# Patient Record
Sex: FEMALE | Race: WHITE | NOT HISPANIC OR LATINO | Employment: UNEMPLOYED | URBAN - METROPOLITAN AREA
[De-identification: names, ages, dates, MRNs, and addresses within clinical notes are randomized per-mention and may not be internally consistent; named-entity substitution may affect disease eponyms.]

---

## 2018-01-01 ENCOUNTER — EXTERNAL RECORD (OUTPATIENT)
Dept: OTHER | Age: 38
End: 2018-01-01

## 2018-11-06 ENCOUNTER — TELEPHONE (OUTPATIENT)
Dept: ORTHOPEDICS | Age: 38
End: 2018-11-06

## 2018-11-09 ENCOUNTER — OFFICE VISIT (OUTPATIENT)
Dept: ORTHOPEDICS | Age: 38
End: 2018-11-09

## 2018-11-09 VITALS — HEIGHT: 69 IN | BODY MASS INDEX: 32.14 KG/M2 | WEIGHT: 217 LBS

## 2018-11-09 DIAGNOSIS — M51.26 LUMBAR DISC HERNIATION: Primary | ICD-10-CM

## 2018-11-09 PROCEDURE — 99203 OFFICE O/P NEW LOW 30 MIN: CPT | Performed by: ORTHOPAEDIC SURGERY

## 2018-11-09 RX ORDER — IBUPROFEN 400 MG/1
400 TABLET ORAL 3 TIMES DAILY PRN
COMMUNITY

## 2018-11-09 RX ORDER — GABAPENTIN 300 MG/1
300 CAPSULE ORAL AT BEDTIME
COMMUNITY

## 2018-11-09 RX ORDER — GABAPENTIN 100 MG/1
100 CAPSULE ORAL
COMMUNITY

## 2018-11-09 RX ORDER — DESVENLAFAXINE 100 MG/1
100 TABLET, EXTENDED RELEASE ORAL DAILY
COMMUNITY

## 2018-11-09 RX ORDER — DOCUSATE SODIUM 100 MG/1
100 CAPSULE, LIQUID FILLED ORAL DAILY
COMMUNITY

## 2018-11-09 RX ORDER — ONDANSETRON 4 MG/1
4 TABLET, ORALLY DISINTEGRATING ORAL EVERY 8 HOURS PRN
COMMUNITY

## 2018-11-09 RX ORDER — ZOLPIDEM TARTRATE 5 MG/1
5 TABLET ORAL NIGHTLY PRN
COMMUNITY

## 2018-11-09 RX ORDER — TRAMADOL HYDROCHLORIDE 50 MG/1
50 TABLET ORAL EVERY 6 HOURS PRN
COMMUNITY

## 2018-11-09 RX ORDER — EPINEPHRINE NASAL SOLUTION 1 MG/ML
0.5 SOLUTION NASAL PRN
COMMUNITY

## 2018-11-09 RX ORDER — PROMETHAZINE HYDROCHLORIDE 12.5 MG/1
12.5 TABLET ORAL EVERY 6 HOURS PRN
COMMUNITY

## 2018-11-09 RX ORDER — DIPHENHYDRAMINE HCL 25 MG
25 CAPSULE ORAL EVERY 4 HOURS PRN
COMMUNITY

## 2018-11-09 RX ORDER — RISPERIDONE 0.5 MG/1
0.5 TABLET ORAL 2 TIMES DAILY
COMMUNITY

## 2018-11-09 RX ORDER — POLYETHYLENE GLYCOL 3350 17 G/17G
17 POWDER, FOR SOLUTION ORAL DAILY
COMMUNITY

## 2018-11-09 RX ORDER — CLONAZEPAM 0.5 MG/1
0.5 TABLET ORAL 2 TIMES DAILY PRN
COMMUNITY

## 2018-11-09 SDOH — HEALTH STABILITY: MENTAL HEALTH: HOW OFTEN DO YOU HAVE A DRINK CONTAINING ALCOHOL?: NEVER

## 2018-11-12 ENCOUNTER — PREP FOR CASE (OUTPATIENT)
Dept: ORTHOPEDICS | Age: 38
End: 2018-11-12

## 2018-11-12 ENCOUNTER — TELEPHONE (OUTPATIENT)
Dept: ORTHOPEDICS | Age: 38
End: 2018-11-12

## 2018-11-12 DIAGNOSIS — M51.26 LUMBAR DISC HERNIATION: Primary | ICD-10-CM

## 2018-11-13 DIAGNOSIS — R52 PAIN: Primary | ICD-10-CM

## 2018-11-14 ENCOUNTER — SURGERY (OUTPATIENT)
Age: 38
End: 2018-11-14

## 2018-11-14 ENCOUNTER — HOSPITAL ENCOUNTER (OUTPATIENT)
Age: 38
Discharge: HOME | End: 2018-11-14
Attending: ORTHOPAEDIC SURGERY | Admitting: ORTHOPAEDIC SURGERY

## 2018-11-14 ENCOUNTER — IMAGING SERVICES (OUTPATIENT)
Dept: GENERAL RADIOLOGY | Age: 38
End: 2018-11-14
Attending: ORTHOPAEDIC SURGERY

## 2018-11-14 VITALS
WEIGHT: 214.7 LBS | HEART RATE: 82 BPM | RESPIRATION RATE: 16 BRPM | TEMPERATURE: 97 F | DIASTOLIC BLOOD PRESSURE: 69 MMHG | HEIGHT: 69 IN | BODY MASS INDEX: 31.8 KG/M2 | SYSTOLIC BLOOD PRESSURE: 104 MMHG | OXYGEN SATURATION: 98 %

## 2018-11-14 DIAGNOSIS — Z32.00 ENCOUNTER FOR PREGNANCY TEST, RESULT UNKNOWN: Primary | ICD-10-CM

## 2018-11-14 DIAGNOSIS — R52 PAIN: ICD-10-CM

## 2018-11-14 LAB
B-HCG UR QL: NEGATIVE
SP GR UR: NORMAL

## 2018-11-14 PROCEDURE — 64483 NJX AA&/STRD TFRM EPI L/S 1: CPT | Performed by: ORTHOPAEDIC SURGERY

## 2018-11-14 PROCEDURE — 64483 NJX AA&/STRD TFRM EPI L/S 1: CPT | Performed by: CLINIC/CENTER

## 2018-11-14 PROCEDURE — 99152 MOD SED SAME PHYS/QHP 5/>YRS: CPT | Performed by: ORTHOPAEDIC SURGERY

## 2018-11-14 RX ORDER — 0.9 % SODIUM CHLORIDE 0.9 %
2 VIAL (ML) INJECTION PRN
Status: DISCONTINUED | OUTPATIENT
Start: 2018-11-14 | End: 2018-11-16 | Stop reason: HOSPADM

## 2018-11-14 RX ORDER — IOPAMIDOL 612 MG/ML
INJECTION, SOLUTION INTRATHECAL PRN
Status: DISCONTINUED | OUTPATIENT
Start: 2018-11-14 | End: 2018-11-16 | Stop reason: HOSPADM

## 2018-11-14 RX ORDER — 0.9 % SODIUM CHLORIDE 0.9 %
2 VIAL (ML) INJECTION EVERY 12 HOURS SCHEDULED
Status: DISCONTINUED | OUTPATIENT
Start: 2018-11-14 | End: 2018-11-16 | Stop reason: HOSPADM

## 2018-11-14 RX ORDER — SODIUM CHLORIDE, SODIUM LACTATE, POTASSIUM CHLORIDE, CALCIUM CHLORIDE 600; 310; 30; 20 MG/100ML; MG/100ML; MG/100ML; MG/100ML
INJECTION, SOLUTION INTRAVENOUS CONTINUOUS
Status: DISCONTINUED | OUTPATIENT
Start: 2018-11-14 | End: 2018-11-16 | Stop reason: HOSPADM

## 2018-11-14 RX ORDER — MIDAZOLAM HYDROCHLORIDE 1 MG/ML
INJECTION, SOLUTION INTRAMUSCULAR; INTRAVENOUS PRN
Status: DISCONTINUED | OUTPATIENT
Start: 2018-11-14 | End: 2018-11-16 | Stop reason: HOSPADM

## 2018-11-14 RX ORDER — LIDOCAINE HYDROCHLORIDE 10 MG/ML
INJECTION, SOLUTION EPIDURAL; INFILTRATION; INTRACAUDAL; PERINEURAL PRN
Status: DISCONTINUED | OUTPATIENT
Start: 2018-11-14 | End: 2018-11-16 | Stop reason: HOSPADM

## 2018-11-14 RX ADMIN — MIDAZOLAM HYDROCHLORIDE 2 MG: 1 INJECTION, SOLUTION INTRAMUSCULAR; INTRAVENOUS at 14:47

## 2018-11-14 RX ADMIN — MIDAZOLAM HYDROCHLORIDE 1 MG: 1 INJECTION, SOLUTION INTRAMUSCULAR; INTRAVENOUS at 14:55

## 2018-11-14 RX ADMIN — IOPAMIDOL 2 ML: 612 INJECTION, SOLUTION INTRATHECAL at 14:54

## 2018-11-14 RX ADMIN — LIDOCAINE HYDROCHLORIDE 5 ML: 10 INJECTION, SOLUTION EPIDURAL; INFILTRATION; INTRACAUDAL; PERINEURAL at 14:54

## 2018-11-14 SDOH — HEALTH STABILITY: MENTAL HEALTH: HOW OFTEN DO YOU HAVE A DRINK CONTAINING ALCOHOL?: NEVER

## 2018-11-14 ASSESSMENT — PAIN SCALES - GENERAL
PAIN_LEVEL_WITH_ACTIVITY: 8
PAIN_LEVEL_AT_REST: 8
PAIN_LEVEL_AT_REST: 6

## 2018-11-14 ASSESSMENT — LIFESTYLE VARIABLES: SMOKING_HISTORY: NO

## 2018-11-19 ENCOUNTER — TELEPHONE (OUTPATIENT)
Dept: ORTHOPEDICS | Age: 38
End: 2018-11-19

## 2018-11-23 ENCOUNTER — APPOINTMENT (OUTPATIENT)
Dept: ORTHOPEDICS | Age: 38
End: 2018-11-23

## 2019-01-16 ENCOUNTER — PREP FOR CASE (OUTPATIENT)
Dept: ORTHOPEDICS | Age: 39
End: 2019-01-16

## 2019-01-16 ENCOUNTER — TELEPHONE (OUTPATIENT)
Dept: ORTHOPEDICS | Age: 39
End: 2019-01-16

## 2019-01-16 DIAGNOSIS — M51.26 LUMBAR DISC HERNIATION: Primary | ICD-10-CM

## 2021-03-31 ENCOUNTER — VIRTUAL VISIT (OUTPATIENT)
Dept: PSYCHIATRY | Facility: CLINIC | Age: 41
End: 2021-03-31
Payer: COMMERCIAL

## 2021-03-31 DIAGNOSIS — Z53.9 DIAGNOSIS NOT YET DEFINED: Primary | ICD-10-CM

## 2021-03-31 RX ORDER — ONDANSETRON 4 MG/1
4 TABLET, ORALLY DISINTEGRATING ORAL PRN
COMMUNITY

## 2021-03-31 RX ORDER — HYDROXYZINE PAMOATE 50 MG/1
50 CAPSULE ORAL
COMMUNITY
End: 2023-06-27

## 2021-03-31 RX ORDER — METHYLPREDNISOLONE 4 MG/1
4 TABLET ORAL
COMMUNITY
End: 2023-06-27

## 2021-03-31 RX ORDER — BUSPIRONE HYDROCHLORIDE 30 MG/1
30 TABLET ORAL 2 TIMES DAILY
COMMUNITY
End: 2023-06-27

## 2021-03-31 RX ORDER — LORATADINE 10 MG/1
10 CAPSULE, LIQUID FILLED ORAL
COMMUNITY
End: 2023-06-27

## 2021-03-31 RX ORDER — TRAZODONE HYDROCHLORIDE 100 MG/1
100 TABLET ORAL
COMMUNITY
End: 2023-06-27

## 2021-03-31 RX ORDER — LEVOTHYROXINE SODIUM 100 UG/1
100 TABLET ORAL DAILY
COMMUNITY

## 2021-03-31 RX ORDER — GABAPENTIN 300 MG/1
300 CAPSULE ORAL
COMMUNITY
End: 2021-09-19

## 2021-03-31 RX ORDER — PRAZOSIN HYDROCHLORIDE 5 MG/1
5 CAPSULE ORAL
COMMUNITY
End: 2023-06-27

## 2021-03-31 RX ORDER — PRAZOSIN HYDROCHLORIDE 2 MG/1
2 CAPSULE ORAL
COMMUNITY
End: 2023-06-27

## 2021-03-31 RX ORDER — CLONAZEPAM 0.5 MG/1
1 TABLET ORAL
COMMUNITY
End: 2023-06-27

## 2021-03-31 SDOH — HEALTH STABILITY: MENTAL HEALTH: HOW OFTEN DO YOU HAVE A DRINK CONTAINING ALCOHOL?: NEVER

## 2021-03-31 ASSESSMENT — PATIENT HEALTH QUESTIONNAIRE - PHQ9
5. POOR APPETITE OR OVEREATING: MORE THAN HALF THE DAYS
SUM OF ALL RESPONSES TO PHQ QUESTIONS 1-9: 3

## 2021-03-31 ASSESSMENT — ANXIETY QUESTIONNAIRES
3. WORRYING TOO MUCH ABOUT DIFFERENT THINGS: MORE THAN HALF THE DAYS
IF YOU CHECKED OFF ANY PROBLEMS ON THIS QUESTIONNAIRE, HOW DIFFICULT HAVE THESE PROBLEMS MADE IT FOR YOU TO DO YOUR WORK, TAKE CARE OF THINGS AT HOME, OR GET ALONG WITH OTHER PEOPLE: SOMEWHAT DIFFICULT
GAD7 TOTAL SCORE: 10
2. NOT BEING ABLE TO STOP OR CONTROL WORRYING: MORE THAN HALF THE DAYS
5. BEING SO RESTLESS THAT IT IS HARD TO SIT STILL: SEVERAL DAYS
7. FEELING AFRAID AS IF SOMETHING AWFUL MIGHT HAPPEN: MORE THAN HALF THE DAYS
6. BECOMING EASILY ANNOYED OR IRRITABLE: NOT AT ALL
1. FEELING NERVOUS, ANXIOUS, OR ON EDGE: SEVERAL DAYS

## 2021-03-31 NOTE — PROGRESS NOTES
Lashell is a 41 year old who is being evaluated via a billable video visit.      How would you like to obtain your AVS? MyChart  If the video visit is dropped, the invitation should be resent by: Send to e-mail at: [email or web address]  Will anyone else be joining your video visit? No      Video Start Time: 115pmg  Video-Visit Details  Amwell malfunction made this visit not possible to complete, patient said she will reschedule

## 2021-03-31 NOTE — PROGRESS NOTES
Depression Response    Patient completed the PHQ-9 assessment for depression and scored >9? No  Question 9 on the PHQ-9 was positive for suicidality? No  Does patient have current mental health provider? No    Is this a virtual visit? Yes   Does patient have suicidal ideation (positive question 9)? No - offer to place Mental Health Referral.  Patient declined referral/not needed    I personally notified the following: visit provider

## 2021-04-01 ASSESSMENT — ANXIETY QUESTIONNAIRES: GAD7 TOTAL SCORE: 10

## 2021-04-11 ENCOUNTER — HEALTH MAINTENANCE LETTER (OUTPATIENT)
Age: 41
End: 2021-04-11

## 2021-09-19 ENCOUNTER — APPOINTMENT (OUTPATIENT)
Dept: GENERAL RADIOLOGY | Facility: OTHER | Age: 41
End: 2021-09-19
Attending: EMERGENCY MEDICINE
Payer: COMMERCIAL

## 2021-09-19 ENCOUNTER — HOSPITAL ENCOUNTER (EMERGENCY)
Facility: OTHER | Age: 41
Discharge: HOME | End: 2021-09-19
Attending: EMERGENCY MEDICINE | Admitting: EMERGENCY MEDICINE
Payer: COMMERCIAL

## 2021-09-19 VITALS
HEART RATE: 116 BPM | BODY MASS INDEX: 37.03 KG/M2 | TEMPERATURE: 97.6 F | WEIGHT: 250 LBS | HEIGHT: 69 IN | DIASTOLIC BLOOD PRESSURE: 81 MMHG | SYSTOLIC BLOOD PRESSURE: 127 MMHG | OXYGEN SATURATION: 95 % | RESPIRATION RATE: 9 BRPM

## 2021-09-19 DIAGNOSIS — R07.89 ATYPICAL CHEST PAIN: ICD-10-CM

## 2021-09-19 PROBLEM — F41.0 PANIC ATTACKS: Status: ACTIVE | Noted: 2017-10-09

## 2021-09-19 PROBLEM — R10.11 RIGHT UPPER QUADRANT PAIN: Status: ACTIVE | Noted: 2021-01-21

## 2021-09-19 PROBLEM — F50.9 EATING DISORDER: Status: ACTIVE | Noted: 2021-09-19

## 2021-09-19 PROBLEM — F32.2 MAJOR DEPRESSIVE DISORDER, SINGLE EPISODE, SEVERE (H): Status: ACTIVE | Noted: 2017-03-13

## 2021-09-19 LAB
ALBUMIN SERPL-MCNC: 4.2 G/DL (ref 3.5–5.7)
ALP SERPL-CCNC: 80 U/L (ref 34–104)
ALT SERPL W P-5'-P-CCNC: 13 U/L (ref 7–52)
ANION GAP SERPL CALCULATED.3IONS-SCNC: 8 MMOL/L (ref 3–14)
AST SERPL W P-5'-P-CCNC: 12 U/L (ref 13–39)
BASOPHILS # BLD AUTO: 0 10E3/UL (ref 0–0.2)
BASOPHILS NFR BLD AUTO: 0 %
BILIRUB SERPL-MCNC: 0.3 MG/DL (ref 0.3–1)
BUN SERPL-MCNC: 13 MG/DL (ref 7–25)
CALCIUM SERPL-MCNC: 9.3 MG/DL (ref 8.6–10.3)
CHLORIDE BLD-SCNC: 104 MMOL/L (ref 98–107)
CO2 SERPL-SCNC: 24 MMOL/L (ref 21–31)
CREAT SERPL-MCNC: 1.02 MG/DL (ref 0.6–1.2)
EOSINOPHIL # BLD AUTO: 0.4 10E3/UL (ref 0–0.7)
EOSINOPHIL NFR BLD AUTO: 4 %
ERYTHROCYTE [DISTWIDTH] IN BLOOD BY AUTOMATED COUNT: 12.8 % (ref 10–15)
GFR SERPL CREATININE-BSD FRML MDRD: 68 ML/MIN/1.73M2
GLUCOSE BLD-MCNC: 109 MG/DL (ref 70–105)
HCT VFR BLD AUTO: 41.7 % (ref 35–47)
HGB BLD-MCNC: 14 G/DL (ref 11.7–15.7)
IMM GRANULOCYTES # BLD: 0.1 10E3/UL
IMM GRANULOCYTES NFR BLD: 1 %
LITHIUM SERPL-SCNC: 0.3 MMOL/L
LYMPHOCYTES # BLD AUTO: 2.6 10E3/UL (ref 0.8–5.3)
LYMPHOCYTES NFR BLD AUTO: 26 %
MCH RBC QN AUTO: 28.7 PG (ref 26.5–33)
MCHC RBC AUTO-ENTMCNC: 33.6 G/DL (ref 31.5–36.5)
MCV RBC AUTO: 86 FL (ref 78–100)
MONOCYTES # BLD AUTO: 0.5 10E3/UL (ref 0–1.3)
MONOCYTES NFR BLD AUTO: 5 %
NEUTROPHILS # BLD AUTO: 6.4 10E3/UL (ref 1.6–8.3)
NEUTROPHILS NFR BLD AUTO: 64 %
NRBC # BLD AUTO: 0 10E3/UL
NRBC BLD AUTO-RTO: 0 /100
PLATELET # BLD AUTO: 229 10E3/UL (ref 150–450)
POTASSIUM BLD-SCNC: 3.9 MMOL/L (ref 3.5–5.1)
PROT SERPL-MCNC: 6.8 G/DL (ref 6.4–8.9)
RBC # BLD AUTO: 4.88 10E6/UL (ref 3.8–5.2)
SODIUM SERPL-SCNC: 136 MMOL/L (ref 134–144)
TROPONIN I SERPL-MCNC: 4.3 PG/ML (ref 0–34)
TROPONIN I SERPL-MCNC: <2.4 PG/ML (ref 0–34)
WBC # BLD AUTO: 10 10E3/UL (ref 4–11)

## 2021-09-19 PROCEDURE — 80178 ASSAY OF LITHIUM: CPT | Performed by: EMERGENCY MEDICINE

## 2021-09-19 PROCEDURE — 84484 ASSAY OF TROPONIN QUANT: CPT | Performed by: EMERGENCY MEDICINE

## 2021-09-19 PROCEDURE — 93005 ELECTROCARDIOGRAM TRACING: CPT | Performed by: EMERGENCY MEDICINE

## 2021-09-19 PROCEDURE — 71045 X-RAY EXAM CHEST 1 VIEW: CPT

## 2021-09-19 PROCEDURE — 250N000013 HC RX MED GY IP 250 OP 250 PS 637: Performed by: EMERGENCY MEDICINE

## 2021-09-19 PROCEDURE — 250N000009 HC RX 250: Performed by: EMERGENCY MEDICINE

## 2021-09-19 PROCEDURE — 250N000011 HC RX IP 250 OP 636: Performed by: EMERGENCY MEDICINE

## 2021-09-19 PROCEDURE — 36415 COLL VENOUS BLD VENIPUNCTURE: CPT | Performed by: EMERGENCY MEDICINE

## 2021-09-19 PROCEDURE — 80053 COMPREHEN METABOLIC PANEL: CPT | Performed by: EMERGENCY MEDICINE

## 2021-09-19 PROCEDURE — 99285 EMERGENCY DEPT VISIT HI MDM: CPT | Mod: 25 | Performed by: EMERGENCY MEDICINE

## 2021-09-19 PROCEDURE — 93010 ELECTROCARDIOGRAM REPORT: CPT | Performed by: INTERNAL MEDICINE

## 2021-09-19 PROCEDURE — 99283 EMERGENCY DEPT VISIT LOW MDM: CPT | Performed by: EMERGENCY MEDICINE

## 2021-09-19 PROCEDURE — 85025 COMPLETE CBC W/AUTO DIFF WBC: CPT | Performed by: EMERGENCY MEDICINE

## 2021-09-19 RX ORDER — LITHIUM CARBONATE 600 MG/1
600 CAPSULE ORAL AT BEDTIME
COMMUNITY
End: 2023-06-27

## 2021-09-19 RX ORDER — LIDOCAINE HYDROCHLORIDE 20 MG/ML
15 SOLUTION OROPHARYNGEAL ONCE
Status: COMPLETED | OUTPATIENT
Start: 2021-09-19 | End: 2021-09-19

## 2021-09-19 RX ORDER — KETOROLAC TROMETHAMINE 30 MG/ML
30 INJECTION, SOLUTION INTRAMUSCULAR; INTRAVENOUS ONCE
Status: COMPLETED | OUTPATIENT
Start: 2021-09-19 | End: 2021-09-19

## 2021-09-19 RX ORDER — MAGNESIUM HYDROXIDE/ALUMINUM HYDROXICE/SIMETHICONE 120; 1200; 1200 MG/30ML; MG/30ML; MG/30ML
15 SUSPENSION ORAL ONCE
Status: COMPLETED | OUTPATIENT
Start: 2021-09-19 | End: 2021-09-19

## 2021-09-19 RX ORDER — DULOXETIN HYDROCHLORIDE 60 MG/1
90 CAPSULE, DELAYED RELEASE ORAL
COMMUNITY
End: 2023-06-27

## 2021-09-19 RX ORDER — ASPIRIN 81 MG/1
324 TABLET, CHEWABLE ORAL ONCE
Status: COMPLETED | OUTPATIENT
Start: 2021-09-19 | End: 2021-09-19

## 2021-09-19 RX ADMIN — KETOROLAC TROMETHAMINE 30 MG: 30 INJECTION, SOLUTION INTRAMUSCULAR; INTRAVENOUS at 12:18

## 2021-09-19 RX ADMIN — MAGNESIUM HYDROXIDE/ALUMINUM HYDROXICE/SIMETHICONE 15 ML: 120; 1200; 1200 SUSPENSION ORAL at 11:39

## 2021-09-19 RX ADMIN — ASPIRIN 81 MG CHEWABLE TABLET 324 MG: 81 TABLET CHEWABLE at 10:24

## 2021-09-19 RX ADMIN — LIDOCAINE HYDROCHLORIDE 15 ML: 20 SOLUTION ORAL; TOPICAL at 11:39

## 2021-09-19 ASSESSMENT — ENCOUNTER SYMPTOMS
DYSURIA: 0
CHILLS: 0
PALPITATIONS: 1
VOMITING: 0
NAUSEA: 0
LIGHT-HEADEDNESS: 0
FEVER: 0
SHORTNESS OF BREATH: 0
AGITATION: 0
CHEST TIGHTNESS: 0
ARTHRALGIAS: 0

## 2021-09-19 ASSESSMENT — MIFFLIN-ST. JEOR: SCORE: 1853.37

## 2021-09-19 NOTE — ED PROVIDER NOTES
History     Chief Complaint   Patient presents with     Chest Pain     HPI  Lashell West is a 41 year old female who is here with chest pain. Couple days ago when she was going to bed she experienced some anterior chest pain which was very bad however it resolved and she went to bed and slept well. Next day she was fine until the evening again she had another episode of pain last night before going to bed she woke up this morning and the symptoms are still there. She felt that her heart was racing. Her watch does take her pulse and it said at one point it was 160. It has not gotten below 100 since that time. Denies fevers or chills, no URI type symptoms does not feel she is coming down with anything. She is vaccinated for Covid. No sick contacts that she is aware of. No palpitations, diaphoresis, shortness of breath, nausea or vomiting. She does have some diarrhea which she has had for the last couple of days. It is not black bloody or tarry. She is drinking a lot of liquids and feels her urine volume is normal.    Allergies:  Allergies   Allergen Reactions     Codeine Anaphylaxis     Latex Rash     Paxil [Paroxetine] Visual Disturbance     psycho       Problem List:    Patient Active Problem List    Diagnosis Date Noted     Eating disorder 09/19/2021     Priority: Medium     Right upper quadrant pain 01/21/2021     Priority: Medium     Panic attacks 10/09/2017     Priority: Medium     Major depressive disorder, single episode, severe (H) 03/13/2017     Priority: Medium     Generalized anxiety disorder 09/14/2016     Priority: Medium     Recurrent urinary tract infection 09/14/2016     Priority: Medium     Rosacea 09/14/2016     Priority: Medium     Family history of malignant neoplasm of ovary 10/09/2014     Priority: Medium     Family history of malignant neoplasm of breast 02/08/2013     Priority: Medium     Asthma 10/18/2011     Priority: Medium     Seasonal allergies 10/18/2011     Priority: Medium     "    Past Medical History:    No past medical history on file.    Past Surgical History:    No past surgical history on file.    Family History:    No family history on file.    Social History:  Marital Status:   [2]  Social History     Tobacco Use     Smoking status: Never Smoker     Smokeless tobacco: Current User   Substance Use Topics     Alcohol use: Never     Drug use: Never        Medications:    busPIRone HCl (BUSPAR) 30 MG tablet  clonazePAM (KLONOPIN) 0.5 MG tablet  hydrOXYzine (VISTARIL) 50 MG capsule  levothyroxine (SYNTHROID/LEVOTHROID) 100 MCG tablet  lithium (ESKALITH) 600 MG capsule  loratadine 10 MG capsule  methylPREDNISolone (MEDROL) 4 MG tablet  ondansetron (ZOFRAN-ODT) 4 MG ODT tab  prazosin (MINIPRESS) 2 MG capsule  prazosin (MINIPRESS) 5 MG capsule  traZODone (DESYREL) 100 MG tablet  brexpiprazole (REXULTI) 1 MG tablet  DULoxetine (CYMBALTA) 60 MG capsule          Review of Systems   Constitutional: Negative for chills and fever.   HENT: Negative for congestion.    Eyes: Negative for visual disturbance.   Respiratory: Negative for chest tightness and shortness of breath.    Cardiovascular: Positive for chest pain and palpitations.   Gastrointestinal: Negative for nausea and vomiting.   Genitourinary: Negative for dysuria.   Musculoskeletal: Negative for arthralgias.   Skin: Negative for rash.   Neurological: Negative for light-headedness.   Psychiatric/Behavioral: Negative for agitation.       Physical Exam   BP: (!) 149/93  Pulse: 119  Temp: 97.6  F (36.4  C)  Resp: 20  Height: 175.3 cm (5' 9\")  Weight: 113.4 kg (250 lb)  SpO2: 98 %      Physical Exam  Vitals and nursing note reviewed.   Constitutional:       Appearance: She is well-developed.   HENT:      Head: Normocephalic and atraumatic.      Mouth/Throat:      Mouth: Mucous membranes are moist.   Eyes:      Conjunctiva/sclera: Conjunctivae normal.   Cardiovascular:      Rate and Rhythm: Regular rhythm. Tachycardia present.      " Heart sounds: Normal heart sounds.   Pulmonary:      Effort: Pulmonary effort is normal.      Breath sounds: Normal breath sounds.   Abdominal:      General: Bowel sounds are normal. There is no distension.      Tenderness: There is no abdominal tenderness.   Skin:     General: Skin is warm and dry.   Neurological:      Mental Status: She is alert and oriented to person, place, and time.   Psychiatric:         Behavior: Behavior normal.         ED Course        Procedures         EKG shows sinus tachycardia 112 bpm.  No acute ST segment or T wave changes.  No ectopy.       Results for orders placed or performed during the hospital encounter of 09/19/21 (from the past 24 hour(s))   CBC with platelets differential    Narrative    The following orders were created for panel order CBC with platelets differential.  Procedure                               Abnormality         Status                     ---------                               -----------         ------                     CBC with platelets and d...[[id number]]  Abnormal            Final result                 Please view results for these tests on the individual orders.   Troponin I   Result Value Ref Range    Troponin I 4.3 0.0 - 34.0 pg/mL   Comprehensive metabolic panel   Result Value Ref Range    Sodium 136 134 - 144 mmol/L    Potassium 3.9 3.5 - 5.1 mmol/L    Chloride 104 98 - 107 mmol/L    Carbon Dioxide (CO2) 24 21 - 31 mmol/L    Anion Gap 8 3 - 14 mmol/L    Urea Nitrogen 13 7 - 25 mg/dL    Creatinine 1.02 0.60 - 1.20 mg/dL    Calcium 9.3 8.6 - 10.3 mg/dL    Glucose 109 (H) 70 - 105 mg/dL    Alkaline Phosphatase 80 34 - 104 U/L    AST 12 (L) 13 - 39 U/L    ALT 13 7 - 52 U/L    Protein Total 6.8 6.4 - 8.9 g/dL    Albumin 4.2 3.5 - 5.7 g/dL    Bilirubin Total 0.3 0.3 - 1.0 mg/dL    GFR Estimate 68 >60 mL/min/1.73m2   CBC with platelets and differential   Result Value Ref Range    WBC Count 10.0 4.0 - 11.0 10e3/uL    RBC Count 4.88 3.80 - 5.20 10e6/uL     Hemoglobin 14.0 11.7 - 15.7 g/dL    Hematocrit 41.7 35.0 - 47.0 %    MCV 86 78 - 100 fL    MCH 28.7 26.5 - 33.0 pg    MCHC 33.6 31.5 - 36.5 g/dL    RDW 12.8 10.0 - 15.0 %    Platelet Count 229 150 - 450 10e3/uL    % Neutrophils 64 %    % Lymphocytes 26 %    % Monocytes 5 %    % Eosinophils 4 %    % Basophils 0 %    % Immature Granulocytes 1 %    NRBCs per 100 WBC 0 <1 /100    Absolute Neutrophils 6.4 1.6 - 8.3 10e3/uL    Absolute Lymphocytes 2.6 0.8 - 5.3 10e3/uL    Absolute Monocytes 0.5 0.0 - 1.3 10e3/uL    Absolute Eosinophils 0.4 0.0 - 0.7 10e3/uL    Absolute Basophils 0.0 0.0 - 0.2 10e3/uL    Absolute Immature Granulocytes 0.1 (H) <=0.0 10e3/uL    Absolute NRBCs 0.0 10e3/uL   Lithium level   Result Value Ref Range    Lithium 0.3   mmol/L   XR Chest Port 1 View    Narrative    Exam:  XR CHEST PORT 1 VIEW    HISTORY: chest pain.    COMPARISON:  None.    FINDINGS:     The cardiomediastinal contours are normal.      No focal consolidation, effusion, or pneumothorax.      No acute osseous abnormality.       Impression    IMPRESSION:      No acute cardiopulmonary process.      GENEVIEVE ABEBE MD         SYSTEM ID:  [id number]   Troponin I   Result Value Ref Range    Troponin I <2.4 0.0 - 34.0 pg/mL       Medications   aspirin (ASA) chewable tablet 324 mg (324 mg Oral Given 9/19/21 1024)   alum & mag hydroxide-simethicone (MAALOX) suspension 15 mL (15 mLs Oral Given 9/19/21 1139)     And   lidocaine (XYLOCAINE) 2 % solution 15 mL (15 mLs Mouth/Throat Given 9/19/21 1139)   ketorolac (TORADOL) injection 30 mg (30 mg Intravenous Given 9/19/21 1218)       Assessments & Plan (with Medical Decision Making)     I have reviewed the nursing notes.    I have reviewed the findings, diagnosis, plan and need for follow up with the patient.  I see no objective signs of any type of cardiac etiology to explain her symptoms.  GI cocktail was not helpful.  She did feel better after some Toradol.  Believe this will could be  musculoskeletal.  Also has history of anxiety and this could be playing a role here.  In any event serial troponins and an EKG and chest x-ray are all reassuring.  Will be discharged home at this time.  Return if worse.    New Prescriptions    No medications on file       Final diagnoses:   Atypical chest pain       9/19/2021   [facility]     Seymour Mckeon MD  09/19/21 [id number]

## 2021-09-19 NOTE — ED TRIAGE NOTES
"ED Nursing Triage Note (General)   ________________________________    Lashell JEANCARLOS West is a 41 year old Female that presents to triage private car  With history of  Chest pain for a couple of days. Pt says around 0500  Chest pain got worse and reports HR in the 160's  has not gone down. Chest pain is in the middle of her chest and radiates down her legs. Pt rates pain 7/10. Pt reports hx of intermittent tachycardia were her HR gets 110-120 but says it never gets above.    BP (!) 149/93   Pulse 118   Resp 20   Ht 1.753 m (5' 9\")   Wt 113.4 kg (250 lb)   SpO2 96%   Breastfeeding No   BMI 36.92 kg/m  t  Patient appears alert  and oriented, in no acute distress., and cooperative and pleasant behavior.  GCS Total = 15  Airway: intact  Breathing noted as Normal  Circulation Normal  Skin:  Normal  Action taken:  Triage to critical care immediately      PRE HOSPITAL PRIOR LIVING SITUATION Spouse  "

## 2021-09-20 LAB
ATRIAL RATE - MUSE: 112 BPM
DIASTOLIC BLOOD PRESSURE - MUSE: NORMAL MMHG
INTERPRETATION ECG - MUSE: NORMAL
P AXIS - MUSE: 69 DEGREES
PR INTERVAL - MUSE: 140 MS
QRS DURATION - MUSE: 84 MS
QT - MUSE: 346 MS
QTC - MUSE: 472 MS
R AXIS - MUSE: 58 DEGREES
SYSTOLIC BLOOD PRESSURE - MUSE: NORMAL MMHG
T AXIS - MUSE: 48 DEGREES
VENTRICULAR RATE- MUSE: 112 BPM

## 2022-05-06 ENCOUNTER — LAB REQUISITION (OUTPATIENT)
Dept: ADMINISTRATIVE | Age: 42
End: 2022-05-06
Payer: COMMERCIAL

## 2022-05-06 PROCEDURE — 81001 URINALYSIS AUTO W/SCOPE: CPT | Performed by: OTHER

## 2022-05-06 PROCEDURE — 81025 URINE PREGNANCY TEST: CPT | Performed by: OTHER

## 2022-05-07 ENCOUNTER — HEALTH MAINTENANCE LETTER (OUTPATIENT)
Age: 42
End: 2022-05-07

## 2022-05-07 LAB
B-HCG UR QL: NEGATIVE
BILIRUB UR QL: NEGATIVE
COLOR UR: YELLOW
GLUCOSE UR-MCNC: NEGATIVE MG/DL
KETONES UR-MCNC: NEGATIVE MG/DL
NITRITE UR QL STRIP.AUTO: NEGATIVE
PH UR: 5 [PH] (ref 5–8)
PROT UR-MCNC: NEGATIVE MG/DL
SP GR UR STRIP: 1.01 (ref 1–1.03)
UROBILINOGEN UR STRIP-ACNC: <2
VIT C UR-MCNC: NEGATIVE MG/DL
WBC CLUMPS UR QL AUTO: PRESENT /HPF

## 2022-05-09 ENCOUNTER — LAB REQUISITION (OUTPATIENT)
Dept: ADMINISTRATIVE | Age: 42
End: 2022-05-09
Payer: COMMERCIAL

## 2022-05-09 LAB
ALBUMIN SERPL-MCNC: 3.7 G/DL (ref 3.4–5)
ALBUMIN/GLOB SERPL: 1.2 {RATIO} (ref 1–2)
ALP LIVER SERPL-CCNC: 102 U/L
ALT SERPL-CCNC: 20 U/L
AMYLASE SERPL-CCNC: 39 U/L (ref 25–115)
ANION GAP SERPL CALC-SCNC: 5 MMOL/L (ref 0–18)
AST SERPL-CCNC: 12 U/L (ref 15–37)
BASOPHILS # BLD AUTO: 0.02 X10(3) UL (ref 0–0.2)
BASOPHILS NFR BLD AUTO: 0.2 %
BILIRUB SERPL-MCNC: 0.5 MG/DL (ref 0.1–2)
BUN BLD-MCNC: 10 MG/DL (ref 7–18)
BUN/CREAT SERPL: 12 (ref 10–20)
CALCIUM BLD-MCNC: 9.2 MG/DL (ref 8.5–10.1)
CHLORIDE SERPL-SCNC: 108 MMOL/L (ref 98–112)
CO2 SERPL-SCNC: 30 MMOL/L (ref 21–32)
CREAT BLD-MCNC: 0.83 MG/DL
DEPRECATED RDW RBC AUTO: 42.2 FL (ref 35.1–46.3)
EOSINOPHIL # BLD AUTO: 0.22 X10(3) UL (ref 0–0.7)
EOSINOPHIL NFR BLD AUTO: 2.4 %
ERYTHROCYTE [DISTWIDTH] IN BLOOD BY AUTOMATED COUNT: 12.9 % (ref 11–15)
FASTING STATUS PATIENT QL REPORTED: NO
GLOBULIN PLAS-MCNC: 3.1 G/DL (ref 2.8–4.4)
GLUCOSE BLD-MCNC: 89 MG/DL (ref 70–99)
HCT VFR BLD AUTO: 47 %
HGB BLD-MCNC: 14.7 G/DL
IMM GRANULOCYTES # BLD AUTO: 0.03 X10(3) UL (ref 0–1)
IMM GRANULOCYTES NFR BLD: 0.3 %
LYMPHOCYTES # BLD AUTO: 2.91 X10(3) UL (ref 1–4)
LYMPHOCYTES NFR BLD AUTO: 31.8 %
MAGNESIUM SERPL-MCNC: 2.3 MG/DL (ref 1.6–2.6)
MCH RBC QN AUTO: 27.9 PG (ref 26–34)
MCHC RBC AUTO-ENTMCNC: 31.3 G/DL (ref 31–37)
MCV RBC AUTO: 89.2 FL
MONOCYTES # BLD AUTO: 0.54 X10(3) UL (ref 0.1–1)
MONOCYTES NFR BLD AUTO: 5.9 %
NEUTROPHILS # BLD AUTO: 5.44 X10 (3) UL (ref 1.5–7.7)
NEUTROPHILS # BLD AUTO: 5.44 X10(3) UL (ref 1.5–7.7)
NEUTROPHILS NFR BLD AUTO: 59.4 %
OSMOLALITY SERPL CALC.SUM OF ELEC: 295 MOSM/KG (ref 275–295)
PHOSPHATE SERPL-MCNC: 3.8 MG/DL (ref 2.5–4.9)
PLATELET # BLD AUTO: 230 10(3)UL (ref 150–450)
POTASSIUM SERPL-SCNC: 4.1 MMOL/L (ref 3.5–5.1)
PROT SERPL-MCNC: 6.8 G/DL (ref 6.4–8.2)
RBC # BLD AUTO: 5.27 X10(6)UL
SODIUM SERPL-SCNC: 143 MMOL/L (ref 136–145)
TSI SER-ACNC: 12.5 MIU/ML (ref 0.36–3.74)
WBC # BLD AUTO: 9.2 X10(3) UL (ref 4–11)

## 2022-05-09 PROCEDURE — 82150 ASSAY OF AMYLASE: CPT | Performed by: OTHER

## 2022-05-09 PROCEDURE — 84443 ASSAY THYROID STIM HORMONE: CPT | Performed by: OTHER

## 2022-05-09 PROCEDURE — 36415 COLL VENOUS BLD VENIPUNCTURE: CPT | Performed by: OTHER

## 2022-05-09 PROCEDURE — 83735 ASSAY OF MAGNESIUM: CPT | Performed by: OTHER

## 2022-05-09 PROCEDURE — 80053 COMPREHEN METABOLIC PANEL: CPT | Performed by: OTHER

## 2022-05-09 PROCEDURE — 85025 COMPLETE CBC W/AUTO DIFF WBC: CPT | Performed by: OTHER

## 2022-05-09 PROCEDURE — 84100 ASSAY OF PHOSPHORUS: CPT | Performed by: OTHER

## 2022-05-10 LAB — T4 FREE SERPL-MCNC: 0.8 NG/DL (ref 0.8–1.7)

## 2022-05-10 PROCEDURE — 84439 ASSAY OF FREE THYROXINE: CPT | Performed by: OTHER

## 2023-04-22 ENCOUNTER — HEALTH MAINTENANCE LETTER (OUTPATIENT)
Age: 43
End: 2023-04-22

## 2023-05-24 ENCOUNTER — PATIENT OUTREACH (OUTPATIENT)
Dept: ONCOLOGY | Facility: CLINIC | Age: 43
End: 2023-05-24
Payer: COMMERCIAL

## 2023-05-24 ENCOUNTER — TRANSCRIBE ORDERS (OUTPATIENT)
Dept: OTHER | Age: 43
End: 2023-05-24

## 2023-05-24 DIAGNOSIS — C50.811 MALIGNANT NEOPLASM OF OVERLAPPING SITES OF RIGHT BREAST IN FEMALE, ESTROGEN RECEPTOR POSITIVE (H): Primary | ICD-10-CM

## 2023-05-24 DIAGNOSIS — Z17.0 MALIGNANT NEOPLASM OF OVERLAPPING SITES OF RIGHT BREAST IN FEMALE, ESTROGEN RECEPTOR POSITIVE (H): Primary | ICD-10-CM

## 2023-05-24 NOTE — PROGRESS NOTES
New Patient Oncology Nurse Navigator Note     Referring provider: John Degroot MD     Referring Clinic/Organization: [facility]     Referred to (specialty:) Cancer Surgery     Requested provider (if applicable): Dr. Karen Fernandez     Date Referral Received: May 24, 2023     Evaluation for:  Breast cancer     Clinical History (per Nurse review of records provided):       Sonja had bilateral screening mammograms on 11/3/22 and within the medial inferior aspect of the right breast there is nodularity and a focal asymmetry. Within the superior medial aspect of the left breast there is a focal asymmetry.  Bilateral diagnostic mammograms followed on 12/7/22 and right breast: Spot compression CC and oblique views demonstrate persistence of an irregularly marginated triangular structure measuring approximately 8 x 10 mm residing within the 3-4 o'clock region of the breast 8 cm from the nipple. Dorsal to this focus there is a less apparent but evident on CC imaging ovoid area of partially circumscribed density measuring 7 mm. Left breast: Spot compression views in the CC and oblique projections of the subareolar region demonstrate persistence of a circumscribed 7 mm mass. On ultrasounds same day: Right breast: At 3:30, 6 cm from the nipple there is an irregularly marginated 2.3 x 1.2 x 1.7 cm hypoechoic mass. At 3:30, 8 cm from the nipple there is a 9 x 7 x 7 mm irregularly marginated hypoechoic mass. This resides approximately 2 cm from the aforementioned lesion. At 3:30, 9 cm from the nipple there is a small satellite nodule measuring 4 x 3 x 5 mm.   Within the right breast at 6 o'clock, 5 cm from the nipple there is a small cyst measuring 3 x 3 mm.   Within the right axilla there is a lymph node exhibiting cortical enlargement with slight lobulation measuring 2.1 x 1.6 x 2.9 cm.   Left breast: Subareolar cysts are identified at 12 o'clock measuring 11 x 6 x 10 and 3 x 2 x 3 and at 9 o'clock, 3 x 3 x 3 mm.     12/20/22 -  Case: [id number]                                 A.  Breast, right, ultrasound guided biopsies at the 330 o'clock position 6 cm from the nipple:  - Invasive ductal carcinoma, Grade 2, ER/AR+, HER2 by FISH negative.     B.  Breast, right, ultrasound guided biopsies at the 330 o'clock position 8 cm from the nipple:  - Invasive ductal carcinoma, see synoptic report     C.  Lymph node, right axillary, ultrasound-guided biopsies:  - Positive for malignant cells, metastatic ductal carcinoma.    MRI showed multifocal disease along with right axillary adenopathy.     She completed 4 cycles of neoadjuvant chemotherapy with doxorubicin and cyclophosphamide 1/26/23-3/9/23 in the care of John Degroot [facility]. She then moved on to weekly paclitaxel 3/23/23. Final Taxol planned for 6/8/23.     Patient also scheduled with Dr. Martha Sky on 6/7 for surgery consult and Dr. Anam Keller (plastics) on 6/1.      Records Location: Care Everywhere, Media and See Bookmarked material     Records Needed:    Path reports-biopsy and surgery (as applicable)    Pathology reviews (as applicable)    Med onc notes    Surgeons' notes (plastics and breast) but partially after Rebecca consult (as applicable)    Genetic results (as applicable)    Echo or MUGA results (as applicable)    Chemotherapy summary(as applicable)    All breast imaging for past 5 years    Patient resides in [place], [place].    Patient is scheduled to meet with Dr. Karen Fernandez on 6/2 at noon.

## 2023-05-30 NOTE — TELEPHONE ENCOUNTER
RECORDS STATUS - BREAST    RECORDS REQUESTED FROM: [facility]   DATE REQUESTED:    NOTES DETAILS STATUS   OFFICE NOTE from referring provider [facility] Dr. John Dgeroot   OFFICE NOTE from medical oncologist [facility] Dr. Degroot: 23   OFFICE NOTE from surgeon [facility] Dr. Selvin Marie: 23   OPERATIVE REPORT [facility] 3/31/15: Hysteroscopy   MEDICATION LIST CE [facility]   INFUSION CE - [facility] 23   LABS     PATHOLOGY REPORTS  (Tissue diagnosis, Stage, ER/SD percentage positive and intensity of staining, HER2 IHC, FISH, and all biopsies from breast and any distant metastasis)                 [facility], Reports in CE, slides requested   [employer] 22: [id number]   GENONOMIC TESTING     TYPE:   (Next Generation Sequencing, including Foundation One testing, and Oncotype score) [facility] 23: [employer]   IMAGING (NEED IMAGES & REPORT)     CT SCANS PACS 23, 23: [facility]   MRI PACS 23: [facility]   MAMMO PACS 22, 11/3/22, 18, 17, 3/22/16: [facility]   ULTRASOUND PACS 23, 22, 22

## 2023-05-31 ENCOUNTER — ANCILLARY ORDERS (OUTPATIENT)
Dept: RADIOLOGY | Facility: CLINIC | Age: 43
End: 2023-05-31

## 2023-06-01 NOTE — PROGRESS NOTES
Action June 1, 2023 11:40 AM ABT   Action Taken Slides from [facility] received and taken to 5th floor path lab for review.    12/20/22: [id number]

## 2023-06-02 ENCOUNTER — PRE VISIT (OUTPATIENT)
Dept: ONCOLOGY | Facility: CLINIC | Age: 43
End: 2023-06-02
Payer: COMMERCIAL

## 2023-06-02 ENCOUNTER — OFFICE VISIT (OUTPATIENT)
Dept: ONCOLOGY | Facility: CLINIC | Age: 43
End: 2023-06-02
Attending: INTERNAL MEDICINE
Payer: COMMERCIAL

## 2023-06-02 ENCOUNTER — ANCILLARY ORDERS (OUTPATIENT)
Dept: ONCOLOGY | Facility: CLINIC | Age: 43
End: 2023-06-02

## 2023-06-02 ENCOUNTER — LAB REQUISITION (OUTPATIENT)
Dept: LAB | Facility: CLINIC | Age: 43
End: 2023-06-02
Payer: COMMERCIAL

## 2023-06-02 ENCOUNTER — HEALTH MAINTENANCE LETTER (OUTPATIENT)
Age: 43
End: 2023-06-02

## 2023-06-02 ENCOUNTER — ANCILLARY PROCEDURE (OUTPATIENT)
Dept: MAMMOGRAPHY | Facility: CLINIC | Age: 43
End: 2023-06-02
Attending: SURGERY
Payer: COMMERCIAL

## 2023-06-02 ENCOUNTER — PATIENT OUTREACH (OUTPATIENT)
Dept: ONCOLOGY | Facility: CLINIC | Age: 43
End: 2023-06-02

## 2023-06-02 VITALS
HEART RATE: 105 BPM | TEMPERATURE: 98.2 F | DIASTOLIC BLOOD PRESSURE: 85 MMHG | HEIGHT: 69 IN | SYSTOLIC BLOOD PRESSURE: 125 MMHG | WEIGHT: 254.5 LBS | OXYGEN SATURATION: 97 % | BODY MASS INDEX: 37.69 KG/M2

## 2023-06-02 DIAGNOSIS — R22.31 MASS OF RIGHT AXILLA: ICD-10-CM

## 2023-06-02 DIAGNOSIS — Z17.0 MALIGNANT NEOPLASM OF OVERLAPPING SITES OF RIGHT BREAST IN FEMALE, ESTROGEN RECEPTOR POSITIVE (H): Primary | ICD-10-CM

## 2023-06-02 DIAGNOSIS — R93.89 ABNORMAL FINDING ON IMAGING: ICD-10-CM

## 2023-06-02 DIAGNOSIS — C50.811 MALIGNANT NEOPLASM OF OVERLAPPING SITES OF RIGHT BREAST IN FEMALE, ESTROGEN RECEPTOR POSITIVE (H): Primary | ICD-10-CM

## 2023-06-02 DIAGNOSIS — Z85.3 PERSONAL HISTORY OF BREAST CANCER: ICD-10-CM

## 2023-06-02 PROCEDURE — 99205 OFFICE O/P NEW HI 60 MIN: CPT | Performed by: SURGERY

## 2023-06-02 PROCEDURE — G0463 HOSPITAL OUTPT CLINIC VISIT: HCPCS | Performed by: SURGERY

## 2023-06-02 PROCEDURE — 76882 US LMTD JT/FCL EVL NVASC XTR: CPT | Mod: RT | Performed by: STUDENT IN AN ORGANIZED HEALTH CARE EDUCATION/TRAINING PROGRAM

## 2023-06-02 PROCEDURE — 99417 PROLNG OP E/M EACH 15 MIN: CPT | Performed by: SURGERY

## 2023-06-02 PROCEDURE — 19285 PERQ DEV BREAST 1ST US IMAG: CPT | Mod: RT | Performed by: STUDENT IN AN ORGANIZED HEALTH CARE EDUCATION/TRAINING PROGRAM

## 2023-06-02 PROCEDURE — 88321 CONSLTJ&REPRT SLD PREP ELSWR: CPT | Performed by: PATHOLOGY

## 2023-06-02 RX ORDER — KETAMINE HCL IN 0.9 % NACL 200 MG/200
100 PLASTIC BAG, INJECTION (ML) INTRAVENOUS
COMMUNITY

## 2023-06-02 RX ORDER — CEFAZOLIN SODIUM 2 G/50ML
2 SOLUTION INTRAVENOUS
Status: CANCELLED | OUTPATIENT
Start: 2023-06-02

## 2023-06-02 RX ORDER — CETIRIZINE HYDROCHLORIDE 10 MG/1
10 CAPSULE, LIQUID FILLED ORAL DAILY
COMMUNITY

## 2023-06-02 RX ORDER — GABAPENTIN 300 MG/1
300 CAPSULE ORAL 2 TIMES DAILY
COMMUNITY
Start: 2023-05-08 | End: 2023-06-27

## 2023-06-02 RX ORDER — SODIUM PHOSPHATE, DIBASIC, ANHYDROUS, POTASSIUM PHOSPHATE, MONOBASIC, AND SODIUM PHOSPHATE, MONOBASIC, MONOHYDRATE 852; 155; 130 MG/1; MG/1; MG/1
2 TABLET, COATED ORAL 2 TIMES DAILY WITH MEALS
COMMUNITY
End: 2023-06-27

## 2023-06-02 RX ORDER — ACETAMINOPHEN 325 MG/1
975 TABLET ORAL ONCE
Status: CANCELLED | OUTPATIENT
Start: 2023-06-02 | End: 2023-06-02

## 2023-06-02 RX ORDER — ATENOLOL 25 MG/1
12.5 TABLET ORAL DAILY
COMMUNITY
End: 2023-06-27

## 2023-06-02 RX ORDER — ACETAMINOPHEN 325 MG/1
650 TABLET ORAL EVERY 4 HOURS PRN
COMMUNITY

## 2023-06-02 RX ORDER — ENOXAPARIN SODIUM 100 MG/ML
40 INJECTION SUBCUTANEOUS
Status: CANCELLED | OUTPATIENT
Start: 2023-06-02

## 2023-06-02 RX ORDER — LIDOCAINE HYDROCHLORIDE AND EPINEPHRINE 10; 10 MG/ML; UG/ML
10 INJECTION, SOLUTION INFILTRATION; PERINEURAL ONCE
Status: COMPLETED | OUTPATIENT
Start: 2023-06-02 | End: 2023-06-02

## 2023-06-02 RX ORDER — CEFAZOLIN SODIUM 2 G/50ML
2 SOLUTION INTRAVENOUS SEE ADMIN INSTRUCTIONS
Status: CANCELLED | OUTPATIENT
Start: 2023-06-02

## 2023-06-02 RX ORDER — EPINEPHRINE 0.3 MG/.3ML
0.3 INJECTION SUBCUTANEOUS PRN
COMMUNITY

## 2023-06-02 RX ORDER — ALBUTEROL SULFATE 90 UG/1
2 AEROSOL, METERED RESPIRATORY (INHALATION) 4 TIMES DAILY PRN
COMMUNITY

## 2023-06-02 RX ADMIN — LIDOCAINE HYDROCHLORIDE AND EPINEPHRINE 10 ML: 10; 10 INJECTION, SOLUTION INFILTRATION; PERINEURAL at 14:06

## 2023-06-02 ASSESSMENT — PAIN SCALES - GENERAL: PAINLEVEL: MODERATE PAIN (4)

## 2023-06-02 NOTE — LETTER
6/2/2023         RE: Lashell West  [address] [facility] [address]        Dear Colleague,    Thank you for referring your patient, Lashell West, to the [facility]. Please see a copy of my visit note below.    NEW SURGICAL CONSULTATION  Jun 2, 2023    Lashell West is a 43 year old woman who presents with a right  breast complaint.  She was referred by John Degroot MD.    HPI:    She noted no symptoms.    Imaging in December 2022 showed a 2.3 cm mass at 3:30 6 cm FN, a 9 mm mass at 3:30 8 cm FN and also a 5 mm mass at 3:30 9 cm FN, all in the RIGHT breast. In addition, a 3 mm cyst was seen at 6:00 5 cm FN in the RIGHT breast. Also, a 2.9 cm RIGHT axillary node was seen.    A biopsy was performed of the 2.3 cm mass at 3:30 6 cm FN and a clip was placed.  It showed invasive ductal carcinoma.  A biopsy was performed of the 1.6 cm mass at 3:30 8 cm FN and a clip was placed.  It showed invasive ductal carcinoma.    A biopsy was performed of the RIGHT axillary mass and a clip was placed.  It showed metastatic ductal carcinoma      Breast MRI     She is tolerating the taxol relatively well. Energy OK. Some neuropathy.  No treatment breaks.  No issues with her port since it was placed.  She did have to have two procedures for it.  The first time her port placement was attempted, she had issues and did not have enough sedation.  The port is located on the LEFT chest wall.    BREAST-SPECIFIC HISTORY:  Prior breast surgeries: No  Prior radiation history: No  Bra size: 38 I  Dominant hand: Right    FAMILY HISTORY:  Genetic testing was completed in February 2023 at [facility] - the clinic note from [facility] was reviewed. The results were negative for pathogenic variants in the [employer] x Hereditary Cancer Germline 52 gene panel.    Breast ca: Yes - sister (dx 41), mat aunts x 3, mother (dx 40), MGM (dx 50), pat aunt, PGM  Ovarian ca:  No  Pancreatic ca: No  Melanoma: No  Gastric ca: No  Colon ca: No  Other cancer: Yes son w/ thyroid ca    Patient Active Problem List   Diagnosis    Asthma    Eating disorder    Family history of malignant neoplasm of breast    Family history of malignant neoplasm of ovary    Generalized anxiety disorder    Major depressive disorder, single episode, severe (H)    Panic attacks    Recurrent urinary tract infection    Right upper quadrant pain    Rosacea    Seasonal allergies    No HTN, DM, MI, CVA  Exercise-induced asthma - no hospitalization    No past medical history on file.    No past surgical history on file.   PONV    Current Outpatient Medications   Medication Sig Dispense Refill    acetaminophen (TYLENOL) 325 MG tablet Take 650 mg by mouth every 4 hours as needed      albuterol (PROAIR HFA/PROVENTIL HFA/VENTOLIN HFA) 108 (90 Base) MCG/ACT inhaler Inhale 2 puffs into the lungs 4 times daily as needed      atenolol (TENORMIN) 25 MG tablet Take 12.5 mg by mouth daily      cetirizine HCl (ZYRTEC ALLERGY) 10 MG CAPS Take 10 mg by mouth daily      gabapentin (NEURONTIN) 300 MG capsule Take 300 mg by mouth 2 times daily      K-Phos-Neutral [id number] MG tablet Take 2 tablets by mouth 2 times daily (with meals)      Ketamine HCl 100 MG/100ML SOLN Inject 100 mg into the vein every 14 days      levothyroxine (SYNTHROID/LEVOTHROID) 100 MCG tablet Take 100 mcg by mouth daily      lithium (ESKALITH) 600 MG capsule Take 600 mg by mouth At Bedtime      loratadine 10 MG capsule Take 10 mg by mouth      ondansetron (ZOFRAN-ODT) 4 MG ODT tab Place 4 mg under the tongue      brexpiprazole (REXULTI) 1 MG tablet 3 mg (Patient not taking: Reported on 6/2/2023)      busPIRone HCl (BUSPAR) 30 MG tablet Take 30 mg by mouth 2 times daily (Patient not taking: Reported on 6/2/2023)      clonazePAM (KLONOPIN) 0.5 MG tablet Take 1 mg by mouth nightly as needed  (Patient not taking: Reported on 6/2/2023)      DULoxetine (CYMBALTA) 60 MG  "capsule Take 90 mg by mouth (Patient not taking: Reported on 6/2/2023)      EPINEPHrine (ANY BX GENERIC EQUIV) 0.3 MG/0.3ML injection 2-pack Inject 0.3 mg into the muscle as needed      hydrOXYzine (VISTARIL) 50 MG capsule Take 50 mg by mouth      methylPREDNISolone (MEDROL) 4 MG tablet Take 4 mg by mouth (Patient not taking: Reported on 6/2/2023)      prazosin (MINIPRESS) 2 MG capsule Take 2 mg by mouth (Patient not taking: Reported on 6/2/2023)      prazosin (MINIPRESS) 5 MG capsule Take 5 mg by mouth (Patient not taking: Reported on 6/2/2023)      traZODone (DESYREL) 100 MG tablet Take 100 mg by mouth (Patient not taking: Reported on 6/2/2023)             Allergies   Allergen Reactions    Latex Rash    Morphine And Related Anaphylaxis    Paxil [Paroxetine] Visual Disturbance     psycho   Steristrips and tegaderm OK  Skin glue OK    SOCIAL HISTORY:  Smokes: No - quit Feb 2023  Occupation: Does not currently work - on disability for mental health    ROS:  Easy bruising/bleeding: No  History of DVT/PE: No    /85   Pulse 105   Temp 98.2  F (36.8  C) (Oral)   Ht 1.752 m (5' 8.98\")   Wt 115.4 kg (254 lb 8 oz)   SpO2 97%   BMI 37.61 kg/m     Physical Exam  Constitutional:       Appearance: She is well-developed.   Chest:   Breasts:     Breasts are symmetrical.      Right: No inverted nipple, mass, nipple discharge, skin change or tenderness.      Left: No inverted nipple, mass, nipple discharge, skin change or tenderness.          Comments: Patient was examined in both supine and upright positions.   Lymphadenopathy:      Cervical: No cervical adenopathy.      Right cervical: No superficial, deep or posterior cervical adenopathy.     Left cervical: No superficial, deep or posterior cervical adenopathy.      Upper Body:      Right upper body: No supraclavicular, axillary or pectoral adenopathy.      Left upper body: No supraclavicular, axillary or pectoral adenopathy.      Comments: No lymphedema in bilateral " upper extremities.   Skin:     General: Skin is warm and dry.        INVESTIGATIONS:    Bilateral Breast MRI from [facility] (1/9/2023) showed:  FINDINGS: The patient's mammogram shows heterogeneously dense breast tissue. The breast MRI shows moderate background parenchymal enhancement.   There are 2 adjacent enhancing masses in the medial right breast mid and posterior depth, which correspond with the ultrasound biopsied masses. There is bridging enhancement between the masses on the breast MRI. There is also a 0.7 cm enhancing satellite lesion just 1 cm more anterior and slightly lateral to the larger anterior mass. A second small 0.6 cm enhancing satellite lesion is noted at the posterior edge of the more posterior mass, inferior edge. This was demonstrated by ultrasound. There are also several smaller more subtle foci of enhancement in the inferior subareolar region 3 cm deep to the nipple which could also represent additional disease. The main mass and 2 adjacent satellite lesions span at least 7 cm in AP diameter. Including the more anterior lesions it would span over 9 cm. No other suspicious areas of enhancement in the right breast.   There are 2 small well-circumscribed areas noted in the sagittal image which do not meet threshold enhancement and have a low suspicion appearance, likely small fibroadenomas. These are located in the central superior position and measure 6 and 9 mm in greatest diameter.   The enlarged right axillary lymph node is also demonstrated with an adjacent signal void from the biopsy marking clip. There is a small lymph node anterior and lateral to the biopsied node, which has questionable thickening of the cortex and measures 0.8 x 0.5 cm. Other lymph nodes overall show normal morphology.   No suspicious areas of enhancement in the left breast. The left axillary lymph nodes show normal morphology. Also a cyst in the subareolar left breast as demonstrated by previous ultrasound.    IMPRESSION:   1.  Two adjacent enhancing masses in the medial right breast with contiguous enhancement between the masses. Also small satellite lesions just anterior and posterior to the 2 masses. There are also several small areas of enhancement in the more anterior inferior subareolar right breast which are suspicious for additional disease. The abnormal areas of enhancement extend at least 9 cm from anterior to posterior.   2.  Two small well-circumscribed lesions noted in the central superior right breast consistent with benign lesions such as small fibroadenomas.   3.  Enlarged right axillary lymph node and equivocal thickening of the cortex of a smaller lymph node located anterior and lateral to the biopsied node. Other lymph nodes retain normal morphology.   4.  Negative left breast. Left axillary lymph nodes appear normal.   BI-RADS 6: Known biopsy-proven malignancy.    Ultrasound from [facility] (12/7/2022) showed:  FINDINGS:   Right breast: At 3:30, 6 cm from the nipple there is an irregularly marginated 2.3 x 1.2 x 1.7 cm hypoechoic mass. At 3:30, 8 cm from the nipple there is a 9 x 7 x 7 mm irregularly marginated hypoechoic mass. This resides approximately 2 cm from the aforementioned lesion. At 3:30, 9 cm from the nipple there is a small satellite nodule measuring 4 x 3 x 5 mm.   Within the right breast at 6 o'clock, 5 cm from the nipple there is a small cyst measuring 3 x 3 mm.   Within the right axilla there is a lymph node exhibiting cortical enlargement with slight lobulation measuring 2.1 x 1.6 x 2.9 cm.   Left breast: Subareolar cysts are identified at 12 o'clock measuring 11 x 6 x 10 and 3 x 2 x 3 and at 9 o'clock, 3 x 3 x 3 mm.   IMPRESSION: The findings were reviewed with the patient in detail. The right breast 3:30, 6 o'clock and 8-9 o'clock foci are suspicious for underlying malignancy. The axillary lymph node is also suspicious.   RECOMMENDATION: Ultrasound-guided biopsy of the 3:30,  6 and 8 cm from nipple foci. Additional ultrasound-guided biopsy right axillary lymph node.   BI-RADS 5: Highly suggestive of malignancy.     Diagnostic Mammogram from [facility] (12/7/2022) showed:  BREAST DENSITY: The breasts are heterogeneously dense, which may obscure small masses.   FINDINGS: Right breast: Spot compression CC and oblique views demonstrate persistence of an irregularly marginated triangular structure measuring approximately 8 x 10 mm residing within the 3-4 o'clock region of the breast 8 cm from the nipple. Dorsal to this focus there is a less apparent but evident on CC imaging ovoid area of partially circumscribed density measuring 7 mm.   Left breast: Spot compression views in the CC and oblique projections of the subareolar region demonstrate persistence of a circumscribed 7 mm mass.   IMPRESSION: Persistent asymmetries/masses both breasts.   RECOMMENDATIONS: Same-day targeted right and left breast ultrasound   BI-RADS 0: Incomplete. Needs additional imaging evaluation and/or prior mammograms for comparison.     Biopsy from [facility] (12/20/2022) showed:  Final Dx     A.  Breast, right, ultrasound guided biopsies at the 330 o'clock position 6 cm from the nipple:  - Invasive ductal carcinoma, see synoptic report     B.  Breast, right, ultrasound guided biopsies at the 330 o'clock position 8 cm from the nipple:  - Invasive ductal carcinoma, see synoptic report     C.  Lymph node, right axillary, ultrasound-guided biopsies:  - Positive for malignant cells, metastatic ductal carcinoma.     ER positive  PA positive  HER2 negative    ASSESSMENT:  Lashell West is a 43 year old woman with RIGHT breast cancer.    Her stage is:   Cancer Staging   Malignant neoplasm of overlapping sites of right breast in female, estrogen receptor positive (H)  Staging form: Breast, AJCC 8th Edition  - Clinical: Stage IIA (cT3, cN1, cM0, G2, ER+, PA+, HER2-) - Signed by Karen Fernandez MD on  6/2/2023     I personally reviewed the imaging above with our in-house breast radiologist.  I am concerned about an asymmetrically enlarged RIGHT internal mammary node seen on her initial breast MRI. We discussed that this node is not typically removed in breast cancer surgery. Given the presence of the RIGHT IM node on imaging and the biopsy proven RIGHT axillary node, adjuvant radiation will be recommended.  I reviewed this imaging with Lashell West today.    The diagnosis and management of locoregionally advanced breast cancer was discussed with Lashell West. She is scheduled to complete neoadjuvant systemic therapy on  6/8/2023. We reviewed that surgical resection is still recommended following neoadjuvant therapy, in the form of either breast conservation (segmental mastectomy plus radiation) or mastectomy.  Surgery is performed 4-6 weeks following completion of systemic therapy.  Lashell West IS a candidate for breast conservation therapy but she is interested in bilateral mastectomy given her extensive family history.  This is very reasonable.    The risks of a mastectomy were discussed with the patient, including the risks of bleeding, wound infection, wound dehiscence, skin flap/nipple necrosis, and seroma formation.   Given the need for adjuvant radiation therapy, immediate reconstruction is typically not recommended. Lashell West was interested in delayed reconstruction; a Plastic Surgery consultation was offered and will be arranged.     In addition to the surgical management of the breast, her axilla must be addressed.  She was initially found to be node positive.  Currently, the adenopathy IS NOT palpable.  I recommended a repeat RIGHT axillary US today. Should she be clinically node negative status following neoadjuvant systemic therapy, she would be a candidate for sentinel lymph node biopsy. This is performed with the combination of the radioactive Tilmanocept and dye  (lymphazurin or indocyanine green). The risks of a sentinel lymph node biopsy were discussed with the patient, including the risks of lymphedema (5-10%), bleeding, wound infection, wound dehiscence, seroma formation, and paresthesias. There is an approximately 10% false negative rate associated with sentinel lymph node biopsy as published in the literature.      She had a biopsy clip placed in the axillary node at the time of needle biopsy.  We reviewed that 85% of the time, the sentinel lymph node is the clipped node. However, because of the potential for it not to map, I have recommended an radiofrequency identification (RFID) seed-localization of that clipped node to facilitate its removal at the time of surgery. The seed would be placed prior to surgery with image guidance.    We discussed that surgical pathology results will be reviewed at the postoperative visit to allow for careful discussion of next steps and for answering questions.    I asked Lashell West to touch base with her medical oncologist regarding whether the vascular access port can be removed at the time of surgery.  She would like to have this done. The risks and benefits of a port removal were discussed, including bleeding, wound infection, and wound dehiscence.   This will be done through the mastectomy incision.    All of the above was discussed with Lashell West and all questions were answered.  She elected to proceed with BILATERAL simple mastectomy and will undergo RIGHT axillary US today.    Total time spent with the patient was 80 minutes, of which 75% was counseling.     PLAN:  BILATERAL simple mastectomy  RIGHT axillary US today to determine axillary surgery  Will need adjuvant radiation given right internal mammary node and right axillary jhonathan involvement  Plan for surgery 4-6 weeks after last cycle of chemotherapy (7/6 to 7/20)  Do not access port on the day of surgery  Patient will discuss with Dr Degroot whether her  port can be removed at the time of surgery  Plastic surgery referral for delayed reconstruction  Radiation oncology referral will be needed post-surgery    Karen Fernandez MD MS [facility] FACS  Associate Professor of Surgery  Division of Surgical Oncology  [facility]     ADDENDUM:  I did speak with Dr Hays today after Lashell West's RIGHT axillary US.  I also spoke with Lashell West regarding the findings. There is mild cortical thickening and it has significantly decreased in size. No other abnormal nodes were seen.  We therefore discussed, RFID seed-localized axillary sentinel lymph node mapping and biopsy as the jhonathan surgery option for Lashell West.  She elected to proceed with BILATERAL simple mastectomy and RIGHT RFID seed-localized axillary sentinel lymph node mapping and biopsy. She had her RFID seed placed today by Dr Hays.    Karen Fernandez MD MS [facility] FACS  Associate Professor of Surgery  Division of Surgical Oncology  [facility]     100 minutes spent on the date of the encounter doing chart review, review of outside records, review of test results, interpretation of tests, patient visit, documentation and discussion with other provider(s).

## 2023-06-02 NOTE — Clinical Note
6/2/2023         RE: Lashell West  [address] [facility] [id number]      NEW SURGICAL CONSULTATION  Jun 2, 2023    Lashell West is a 43 year old woman who presents with a right  breast complaint.  She was referred by John Degroot MD.    HPI:    She noted no symptoms.    Imaging in December 2022 showed a 2.3 cm mass at 3:30 6 cm FN, a 9 mm mass at 3:30 8 cm FN and also a 5 mm mass at 3:30 9 cm FN, all in the RIGHT breast. In addition, a 3 mm cyst was seen at 6:00 5 cm FN in the RIGHT breast. Also, a 2.9 cm RIGHT axillary node was seen.    A biopsy was performed of the 2.3 cm mass at 3:30 6 cm FN and a clip was placed.  It showed invasive ductal carcinoma.  A biopsy was performed of the 1.6 cm mass at 3:30 8 cm FN and a clip was placed.  It showed invasive ductal carcinoma.    A biopsy was performed of the RIGHT axillary mass and a clip was placed.  It showed metastatic ductal carcinoma      Breast MRI     She is tolerating the taxol relatively well. Energy OK. Some neuropathy.  No treatment breaks.  No issues with her port since it was placed.  She did have to have two procedures for it.  The first time her port placement was attempted, she had issues and did not have enough sedation.  The port is located on the LEFT chest wall.    BREAST-SPECIFIC HISTORY:  Prior breast surgeries: No  Prior radiation history: No  Bra size: 38 I  Dominant hand: Right    FAMILY HISTORY:  Genetic testing was completed in February 2023 at [facility] - the clinic note from [facility] was reviewed. The results were negative for pathogenic variants in the [employer] [facility]itary [facility] Germline 52 gene panel.    Breast ca: Yes - sister (dx 41), mat aunts x 3, mother (dx 40), MGM (dx 50), pat aunt, PGM  Ovarian ca: No  Pancreatic ca: No  Melanoma: No  Gastric ca: No  Colon ca: No  Other cancer: Yes son w/ thyroid ca    Patient Active Problem List   Diagnosis     Asthma     Eating disorder     Family  history of malignant neoplasm of breast     Family history of malignant neoplasm of ovary     Generalized anxiety disorder     Major depressive disorder, single episode, severe (H)     Panic attacks     Recurrent urinary tract infection     Right upper quadrant pain     Rosacea     Seasonal allergies    No HTN, DM, MI, CVA  Exercise-induced asthma - no hospitalization    No past medical history on file.    No past surgical history on file.   PONV    Current Outpatient Medications   Medication Sig Dispense Refill     acetaminophen (TYLENOL) 325 MG tablet Take 650 mg by mouth every 4 hours as needed       albuterol (PROAIR HFA/PROVENTIL HFA/VENTOLIN HFA) 108 (90 Base) MCG/ACT inhaler Inhale 2 puffs into the lungs 4 times daily as needed       atenolol (TENORMIN) 25 MG tablet Take 12.5 mg by mouth daily       cetirizine HCl (ZYRTEC ALLERGY) 10 MG CAPS Take 10 mg by mouth daily       gabapentin (NEURONTIN) 300 MG capsule Take 300 mg by mouth 2 times daily       K-Phos-Neutral [id number] MG tablet Take 2 tablets by mouth 2 times daily (with meals)       Ketamine HCl 100 MG/100ML SOLN Inject 100 mg into the vein every 14 days       levothyroxine (SYNTHROID/LEVOTHROID) 100 MCG tablet Take 100 mcg by mouth daily       lithium (ESKALITH) 600 MG capsule Take 600 mg by mouth At Bedtime       loratadine 10 MG capsule Take 10 mg by mouth       ondansetron (ZOFRAN-ODT) 4 MG ODT tab Place 4 mg under the tongue       brexpiprazole (REXULTI) 1 MG tablet 3 mg (Patient not taking: Reported on 6/2/2023)       busPIRone HCl (BUSPAR) 30 MG tablet Take 30 mg by mouth 2 times daily (Patient not taking: Reported on 6/2/2023)       clonazePAM (KLONOPIN) 0.5 MG tablet Take 1 mg by mouth nightly as needed  (Patient not taking: Reported on 6/2/2023)       DULoxetine (CYMBALTA) 60 MG capsule Take 90 mg by mouth (Patient not taking: Reported on 6/2/2023)       EPINEPHrine (ANY BX GENERIC EQUIV) 0.3 MG/0.3ML injection 2-pack Inject 0.3 mg into  "the muscle as needed       hydrOXYzine (VISTARIL) 50 MG capsule Take 50 mg by mouth       methylPREDNISolone (MEDROL) 4 MG tablet Take 4 mg by mouth (Patient not taking: Reported on 6/2/2023)       prazosin (MINIPRESS) 2 MG capsule Take 2 mg by mouth (Patient not taking: Reported on 6/2/2023)       prazosin (MINIPRESS) 5 MG capsule Take 5 mg by mouth (Patient not taking: Reported on 6/2/2023)       traZODone (DESYREL) 100 MG tablet Take 100 mg by mouth (Patient not taking: Reported on 6/2/2023)             Allergies   Allergen Reactions     Latex Rash     Morphine And Related Anaphylaxis     Paxil [Paroxetine] Visual Disturbance     psycho   Steristrips and tegaderm OK  Skin glue OK    SOCIAL HISTORY:  Smokes: No - quit Feb 2023  Occupation: Does not currently work - on disability for mental health    ROS:  Easy bruising/bleeding: No  History of DVT/PE: No    /85   Pulse 105   Temp 98.2  F (36.8  C) (Oral)   Ht 1.752 m (5' 8.98\")   Wt 115.4 kg (254 lb 8 oz)   SpO2 97%   BMI 37.61 kg/m     Physical Exam  Constitutional:       Appearance: She is well-developed.   Chest:   Breasts:     Breasts are symmetrical.      Right: No inverted nipple, mass, nipple discharge, skin change or tenderness.      Left: No inverted nipple, mass, nipple discharge, skin change or tenderness.          Comments: Patient was examined in both supine and upright positions.   Lymphadenopathy:      Cervical: No cervical adenopathy.      Right cervical: No superficial, deep or posterior cervical adenopathy.     Left cervical: No superficial, deep or posterior cervical adenopathy.      Upper Body:      Right upper body: No supraclavicular, axillary or pectoral adenopathy.      Left upper body: No supraclavicular, axillary or pectoral adenopathy.      Comments: No lymphedema in bilateral upper extremities.   Skin:     General: Skin is warm and dry.        INVESTIGATIONS:    Bilateral Breast MRI from [facility] (1/9/2023) " showed:  FINDINGS: The patient's mammogram shows heterogeneously dense breast tissue. The breast MRI shows moderate background parenchymal enhancement.   There are 2 adjacent enhancing masses in the medial right breast mid and posterior depth, which correspond with the ultrasound biopsied masses. There is bridging enhancement between the masses on the breast MRI. There is also a 0.7 cm enhancing satellite lesion just 1 cm more anterior and slightly lateral to the larger anterior mass. A second small 0.6 cm enhancing satellite lesion is noted at the posterior edge of the more posterior mass, inferior edge. This was demonstrated by ultrasound. There are also several smaller more subtle foci of enhancement in the inferior subareolar region 3 cm deep to the nipple which could also represent additional disease. The main mass and 2 adjacent satellite lesions span at least 7 cm in AP diameter. Including the more anterior lesions it would span over 9 cm. No other suspicious areas of enhancement in the right breast.   There are 2 small well-circumscribed areas noted in the sagittal image which do not meet threshold enhancement and have a low suspicion appearance, likely small fibroadenomas. These are located in the central superior position and measure 6 and 9 mm in greatest diameter.   The enlarged right axillary lymph node is also demonstrated with an adjacent signal void from the biopsy marking clip. There is a small lymph node anterior and lateral to the biopsied node, which has questionable thickening of the cortex and measures 0.8 x 0.5 cm. Other lymph nodes overall show normal morphology.   No suspicious areas of enhancement in the left breast. The left axillary lymph nodes show normal morphology. Also a cyst in the subareolar left breast as demonstrated by previous ultrasound.   IMPRESSION:   1.  Two adjacent enhancing masses in the medial right breast with contiguous enhancement between the masses. Also small  satellite lesions just anterior and posterior to the 2 masses. There are also several small areas of enhancement in the more anterior inferior subareolar right breast which are suspicious for additional disease. The abnormal areas of enhancement extend at least 9 cm from anterior to posterior.   2.  Two small well-circumscribed lesions noted in the central superior right breast consistent with benign lesions such as small fibroadenomas.   3.  Enlarged right axillary lymph node and equivocal thickening of the cortex of a smaller lymph node located anterior and lateral to the biopsied node. Other lymph nodes retain normal morphology.   4.  Negative left breast. Left axillary lymph nodes appear normal.   BI-RADS 6: Known biopsy-proven malignancy.    Ultrasound from [facility] (12/7/2022) showed:  FINDINGS:   Right breast: At 3:30, 6 cm from the nipple there is an irregularly marginated 2.3 x 1.2 x 1.7 cm hypoechoic mass. At 3:30, 8 cm from the nipple there is a 9 x 7 x 7 mm irregularly marginated hypoechoic mass. This resides approximately 2 cm from the aforementioned lesion. At 3:30, 9 cm from the nipple there is a small satellite nodule measuring 4 x 3 x 5 mm.   Within the right breast at 6 o'clock, 5 cm from the nipple there is a small cyst measuring 3 x 3 mm.   Within the right axilla there is a lymph node exhibiting cortical enlargement with slight lobulation measuring 2.1 x 1.6 x 2.9 cm.   Left breast: Subareolar cysts are identified at 12 o'clock measuring 11 x 6 x 10 and 3 x 2 x 3 and at 9 o'clock, 3 x 3 x 3 mm.   IMPRESSION: The findings were reviewed with the patient in detail. The right breast 3:30, 6 o'clock and 8-9 o'clock foci are suspicious for underlying malignancy. The axillary lymph node is also suspicious.   RECOMMENDATION: Ultrasound-guided biopsy of the 3:30, 6 and 8 cm from nipple foci. Additional ultrasound-guided biopsy right axillary lymph node.   BI-RADS 5: Highly suggestive of  malignancy.     Diagnostic Mammogram from  (12/7/2022) showed:  BREAST DENSITY: The breasts are heterogeneously dense, which may obscure small masses.   FINDINGS: Right breast: Spot compression CC and oblique views demonstrate persistence of an irregularly marginated triangular structure measuring approximately 8 x 10 mm residing within the 3-4 o'clock region of the breast 8 cm from the nipple. Dorsal to this focus there is a less apparent but evident on CC imaging ovoid area of partially circumscribed density measuring 7 mm.   Left breast: Spot compression views in the CC and oblique projections of the subareolar region demonstrate persistence of a circumscribed 7 mm mass.   IMPRESSION: Persistent asymmetries/masses both breasts.   RECOMMENDATIONS: Same-day targeted right and left breast ultrasound   BI-RADS 0: Incomplete. Needs additional imaging evaluation and/or prior mammograms for comparison.     Biopsy from  (12/20/2022) showed:  Final Dx     A.  Breast, right, ultrasound guided biopsies at the 330 o'clock position 6 cm from the nipple:  - Invasive ductal carcinoma, see synoptic report     B.  Breast, right, ultrasound guided biopsies at the 330 o'clock position 8 cm from the nipple:  - Invasive ductal carcinoma, see synoptic report     C.  Lymph node, right axillary, ultrasound-guided biopsies:  - Positive for malignant cells, metastatic ductal carcinoma.     ER positive  CA positive  HER2 negative    ASSESSMENT:  Lashell West is a 43 year old woman with RIGHT breast cancer.    Her stage is:   Cancer Staging   Malignant neoplasm of overlapping sites of right breast in female, estrogen receptor positive (H)  Staging form: Breast, AJCC 8th Edition  - Clinical: Stage IIA (cT3, cN1, cM0, G2, ER+, CA+, HER2-) - Signed by Karen Fernandez MD on 6/2/2023     I personally reviewed the imaging above with our in-house breast radiologist.  I am concerned about an asymmetrically  enlarged RIGHT internal mammary node seen on her initial breast MRI. We discussed that this node is not typically removed in breast cancer surgery. Given the presence of the RIGHT IM node on imaging and the biopsy proven RIGHT axillary node, adjuvant radiation will be recommended.  I reviewed this imaging with Lashell West today.    The diagnosis and management of locoregionally advanced breast cancer was discussed with Lashell West. She is scheduled to complete neoadjuvant systemic therapy on  6/8/2023. We reviewed that surgical resection is still recommended following neoadjuvant therapy, in the form of either breast conservation (segmental mastectomy plus radiation) or mastectomy.  Surgery is performed 4-6 weeks following completion of systemic therapy.  Lashell West IS a candidate for breast conservation therapy but she is interested in bilateral mastectomy given her extensive family history.  This is very reasonable.    The risks of a mastectomy were discussed with the patient, including the risks of bleeding, wound infection, wound dehiscence, skin flap/nipple necrosis, and seroma formation.   Given the need for adjuvant radiation therapy, immediate reconstruction is typically not recommended. Lashell West was interested in delayed reconstruction; a Plastic Surgery consultation was offered and will be arranged.     In addition to the surgical management of the breast, her axilla must be addressed.  She was initially found to be node positive.  Currently, the adenopathy IS NOT palpable.  I recommended a repeat RIGHT axillary US today. Should she be clinically node negative status following neoadjuvant systemic therapy, she would be a candidate for sentinel lymph node biopsy. This is performed with the combination of the radioactive Tilmanocept and dye (lymphazurin or indocyanine green). The risks of a sentinel lymph node biopsy were discussed with the patient, including the risks of  lymphedema (5-10%), bleeding, wound infection, wound dehiscence, seroma formation, and paresthesias. There is an approximately 10% false negative rate associated with sentinel lymph node biopsy as published in the literature.      She had a biopsy clip placed in the axillary node at the time of needle biopsy.  We reviewed that 85% of the time, the sentinel lymph node is the clipped node. However, because of the potential for it not to map, I have recommended an radiofrequency identification (RFID) seed-localization of that clipped node to facilitate its removal at the time of surgery. The seed would be placed prior to surgery with image guidance.    We discussed that surgical pathology results will be reviewed at the postoperative visit to allow for careful discussion of next steps and for answering questions.    I asked Lashell West to touch base with her medical oncologist regarding whether the vascular access port can be removed at the time of surgery.  She would like to have this done. The risks and benefits of a port removal were discussed, including bleeding, wound infection, and wound dehiscence.   This will be done through the mastectomy incision.    All of the above was discussed with Lashell West and all questions were answered.  She elected to proceed with BILATERAL simple mastectomy and will undergo RIGHT axillary US today.    Total time spent with the patient was 80 minutes, of which 75% was counseling.     PLAN:  1. BILATERAL simple mastectomy  2. RIGHT axillary US today to determine axillary surgery  3. Will need adjuvant radiation given right internal mammary node and right axillary jhonathan involvement  4. Plan for surgery 4-6 weeks after last cycle of chemotherapy (7/6 to 7/20)  5. Do not access port on the day of surgery  6. Patient will discuss with Dr Degroot whether her port can be removed at the time of surgery  7. Plastic surgery referral for delayed reconstruction  8. Radiation  oncology referral will be needed post-surgery    Karen Fernandez MD MS [facility] FACS  Associate Professor of Surgery  Division of Surgical Oncology  [facility]     ADDENDUM:  I did speak with Dr Hays today after Lashell West's RIGHT axillary US.  I also spoke with Lashell West regarding the findings. There is mild cortical thickening and it has significantly decreased in size. No other abnormal nodes were seen.  We therefore discussed, RFID seed-localized axillary sentinel lymph node mapping and biopsy as the jhonathan surgery option for Lashell West.  She elected to proceed with BILATERAL simple mastectomy and RIGHT RFID seed-localized axillary sentinel lymph node mapping and biopsy. She had her RFID seed placed today by Dr Hays.    Karen Fernandez MD MS [facility] FACS  Associate Professor of Surgery  Division of Surgical Oncology  [facility]     100 minutes spent on the date of the encounter doing chart review, review of outside records, review of test results, interpretation of tests, patient visit, documentation and discussion with other provider(s).            Karen Fernandez MD

## 2023-06-02 NOTE — LETTER
2023         RE: Lashell West  [address] [facility] [address]    2023    John Degroot MD  [address] [place] [id number]    RE: Lashell West  (: 1980)    Dear Dr. Degroot     Your patient was seen for evaluation in my office.  Please find a copy of my notes for your record and review.  If you have any further questions, please feel free to contact my office.   Thank you for your kind referral.    Sincerely,   Karen Fernandez MD MSc [facility]    ---     NEW SURGICAL CONSULTATION  2023    Lashell West is a 43 year old woman who presents with a right  breast complaint.  She was referred by John Degroot MD.    HPI:    She noted no symptoms.    Imaging in 2022 showed a 2.3 cm mass at 3:30 6 cm FN, a 9 mm mass at 3:30 8 cm FN and also a 5 mm mass at 3:30 9 cm FN, all in the RIGHT breast. In addition, a 3 mm cyst was seen at 6:00 5 cm FN in the RIGHT breast. Also, a 2.9 cm RIGHT axillary node was seen.    A biopsy was performed of the 2.3 cm mass at 3:30 6 cm FN and a clip was placed.  It showed invasive ductal carcinoma.  A biopsy was performed of the 1.6 cm mass at 3:30 8 cm FN and a clip was placed.  It showed invasive ductal carcinoma.    A biopsy was performed of the RIGHT axillary mass and a clip was placed.  It showed metastatic ductal carcinoma      Breast MRI     She is tolerating the taxol relatively well. Energy OK. Some neuropathy.  No treatment breaks.  No issues with her port since it was placed.  She did have to have two procedures for it.  The first time her port placement was attempted, she had issues and did not have enough sedation.  The port is located on the LEFT chest wall.    BREAST-SPECIFIC HISTORY:  Prior breast surgeries: No  Prior radiation history: No  Bra size: 38 I  Dominant hand: Right    FAMILY HISTORY:  Genetic testing was completed in 2023 at [facility] clinic note from Lucía  Jocelynn [facility] was reviewed. The results were negative for pathogenic variants in the [facility] Germline 52 gene panel.    Breast ca: Yes - sister (dx 41), mat aunts x 3, mother (dx 40), MGM (dx 50), pat aunt, PGM  Ovarian ca: No  Pancreatic ca: No  Melanoma: No  Gastric ca: No  Colon ca: No  Other cancer: Yes son w/ thyroid ca    Patient Active Problem List   Diagnosis    Asthma    Eating disorder    Family history of malignant neoplasm of breast    Family history of malignant neoplasm of ovary    Generalized anxiety disorder    Major depressive disorder, single episode, severe (H)    Panic attacks    Recurrent urinary tract infection    Right upper quadrant pain    Rosacea    Seasonal allergies    No HTN, DM, MI, CVA  Exercise-induced asthma - no hospitalization    No past medical history on file.    No past surgical history on file.   PONV    Current Outpatient Medications   Medication Sig Dispense Refill    acetaminophen (TYLENOL) 325 MG tablet Take 650 mg by mouth every 4 hours as needed      albuterol (PROAIR HFA/PROVENTIL HFA/VENTOLIN HFA) 108 (90 Base) MCG/ACT inhaler Inhale 2 puffs into the lungs 4 times daily as needed      atenolol (TENORMIN) 25 MG tablet Take 12.5 mg by mouth daily      cetirizine HCl (ZYRTEC ALLERGY) 10 MG CAPS Take 10 mg by mouth daily      gabapentin (NEURONTIN) 300 MG capsule Take 300 mg by mouth 2 times daily      K-Phos-Neutral [id number] MG tablet Take 2 tablets by mouth 2 times daily (with meals)      Ketamine HCl 100 MG/100ML SOLN Inject 100 mg into the vein every 14 days      levothyroxine (SYNTHROID/LEVOTHROID) 100 MCG tablet Take 100 mcg by mouth daily      lithium (ESKALITH) 600 MG capsule Take 600 mg by mouth At Bedtime      loratadine 10 MG capsule Take 10 mg by mouth      ondansetron (ZOFRAN-ODT) 4 MG ODT tab Place 4 mg under the tongue      brexpiprazole (REXULTI) 1 MG tablet 3 mg (Patient not taking: Reported on 6/2/2023)      busPIRone HCl (BUSPAR) 30  "MG tablet Take 30 mg by mouth 2 times daily (Patient not taking: Reported on 6/2/2023)      clonazePAM (KLONOPIN) 0.5 MG tablet Take 1 mg by mouth nightly as needed  (Patient not taking: Reported on 6/2/2023)      DULoxetine (CYMBALTA) 60 MG capsule Take 90 mg by mouth (Patient not taking: Reported on 6/2/2023)      EPINEPHrine (ANY BX GENERIC EQUIV) 0.3 MG/0.3ML injection 2-pack Inject 0.3 mg into the muscle as needed      hydrOXYzine (VISTARIL) 50 MG capsule Take 50 mg by mouth      methylPREDNISolone (MEDROL) 4 MG tablet Take 4 mg by mouth (Patient not taking: Reported on 6/2/2023)      prazosin (MINIPRESS) 2 MG capsule Take 2 mg by mouth (Patient not taking: Reported on 6/2/2023)      prazosin (MINIPRESS) 5 MG capsule Take 5 mg by mouth (Patient not taking: Reported on 6/2/2023)      traZODone (DESYREL) 100 MG tablet Take 100 mg by mouth (Patient not taking: Reported on 6/2/2023)             Allergies   Allergen Reactions    Latex Rash    Morphine And Related Anaphylaxis    Paxil [Paroxetine] Visual Disturbance     psycho   Steristrips and tegaderm OK  Skin glue OK    SOCIAL HISTORY:  Smokes: No - quit Feb 2023  Occupation: Does not currently work - on disability for mental health    ROS:  Easy bruising/bleeding: No  History of DVT/PE: No    /85   Pulse 105   Temp 98.2  F (36.8  C) (Oral)   Ht 1.752 m (5' 8.98\")   Wt 115.4 kg (254 lb 8 oz)   SpO2 97%   BMI 37.61 kg/m     Physical Exam  Constitutional:       Appearance: She is well-developed.   Chest:   Breasts:     Breasts are symmetrical.      Right: No inverted nipple, mass, nipple discharge, skin change or tenderness.      Left: No inverted nipple, mass, nipple discharge, skin change or tenderness.          Comments: Patient was examined in both supine and upright positions.   Lymphadenopathy:      Cervical: No cervical adenopathy.      Right cervical: No superficial, deep or posterior cervical adenopathy.     Left cervical: No superficial, deep " or posterior cervical adenopathy.      Upper Body:      Right upper body: No supraclavicular, axillary or pectoral adenopathy.      Left upper body: No supraclavicular, axillary or pectoral adenopathy.      Comments: No lymphedema in bilateral upper extremities.   Skin:     General: Skin is warm and dry.        INVESTIGATIONS:    Bilateral Breast MRI from [facility] (1/9/2023) showed:  FINDINGS: The patient's mammogram shows heterogeneously dense breast tissue. The breast MRI shows moderate background parenchymal enhancement.   There are 2 adjacent enhancing masses in the medial right breast mid and posterior depth, which correspond with the ultrasound biopsied masses. There is bridging enhancement between the masses on the breast MRI. There is also a 0.7 cm enhancing satellite lesion just 1 cm more anterior and slightly lateral to the larger anterior mass. A second small 0.6 cm enhancing satellite lesion is noted at the posterior edge of the more posterior mass, inferior edge. This was demonstrated by ultrasound. There are also several smaller more subtle foci of enhancement in the inferior subareolar region 3 cm deep to the nipple which could also represent additional disease. The main mass and 2 adjacent satellite lesions span at least 7 cm in AP diameter. Including the more anterior lesions it would span over 9 cm. No other suspicious areas of enhancement in the right breast.   There are 2 small well-circumscribed areas noted in the sagittal image which do not meet threshold enhancement and have a low suspicion appearance, likely small fibroadenomas. These are located in the central superior position and measure 6 and 9 mm in greatest diameter.   The enlarged right axillary lymph node is also demonstrated with an adjacent signal void from the biopsy marking clip. There is a small lymph node anterior and lateral to the biopsied node, which has questionable thickening of the cortex and measures 0.8 x 0.5 cm.  Other lymph nodes overall show normal morphology.   No suspicious areas of enhancement in the left breast. The left axillary lymph nodes show normal morphology. Also a cyst in the subareolar left breast as demonstrated by previous ultrasound.   IMPRESSION:   1.  Two adjacent enhancing masses in the medial right breast with contiguous enhancement between the masses. Also small satellite lesions just anterior and posterior to the 2 masses. There are also several small areas of enhancement in the more anterior inferior subareolar right breast which are suspicious for additional disease. The abnormal areas of enhancement extend at least 9 cm from anterior to posterior.   2.  Two small well-circumscribed lesions noted in the central superior right breast consistent with benign lesions such as small fibroadenomas.   3.  Enlarged right axillary lymph node and equivocal thickening of the cortex of a smaller lymph node located anterior and lateral to the biopsied node. Other lymph nodes retain normal morphology.   4.  Negative left breast. Left axillary lymph nodes appear normal.   BI-RADS 6: Known biopsy-proven malignancy.    Ultrasound from [facility] (12/7/2022) showed:  FINDINGS:   Right breast: At 3:30, 6 cm from the nipple there is an irregularly marginated 2.3 x 1.2 x 1.7 cm hypoechoic mass. At 3:30, 8 cm from the nipple there is a 9 x 7 x 7 mm irregularly marginated hypoechoic mass. This resides approximately 2 cm from the aforementioned lesion. At 3:30, 9 cm from the nipple there is a small satellite nodule measuring 4 x 3 x 5 mm.   Within the right breast at 6 o'clock, 5 cm from the nipple there is a small cyst measuring 3 x 3 mm.   Within the right axilla there is a lymph node exhibiting cortical enlargement with slight lobulation measuring 2.1 x 1.6 x 2.9 cm.   Left breast: Subareolar cysts are identified at 12 o'clock measuring 11 x 6 x 10 and 3 x 2 x 3 and at 9 o'clock, 3 x 3 x 3 mm.   IMPRESSION: The  findings were reviewed with the patient in detail. The right breast 3:30, 6 o'clock and 8-9 o'clock foci are suspicious for underlying malignancy. The axillary lymph node is also suspicious.   RECOMMENDATION: Ultrasound-guided biopsy of the 3:30, 6 and 8 cm from nipple foci. Additional ultrasound-guided biopsy right axillary lymph node.   BI-RADS 5: Highly suggestive of malignancy.     Diagnostic Mammogram from [facility] (12/7/2022) showed:  BREAST DENSITY: The breasts are heterogeneously dense, which may obscure small masses.   FINDINGS: Right breast: Spot compression CC and oblique views demonstrate persistence of an irregularly marginated triangular structure measuring approximately 8 x 10 mm residing within the 3-4 o'clock region of the breast 8 cm from the nipple. Dorsal to this focus there is a less apparent but evident on CC imaging ovoid area of partially circumscribed density measuring 7 mm.   Left breast: Spot compression views in the CC and oblique projections of the subareolar region demonstrate persistence of a circumscribed 7 mm mass.   IMPRESSION: Persistent asymmetries/masses both breasts.   RECOMMENDATIONS: Same-day targeted right and left breast ultrasound   BI-RADS 0: Incomplete. Needs additional imaging evaluation and/or prior mammograms for comparison.     Biopsy from [facility] (12/20/2022) showed:  Final Dx     A.  Breast, right, ultrasound guided biopsies at the 330 o'clock position 6 cm from the nipple:  - Invasive ductal carcinoma, see synoptic report     B.  Breast, right, ultrasound guided biopsies at the 330 o'clock position 8 cm from the nipple:  - Invasive ductal carcinoma, see synoptic report     C.  Lymph node, right axillary, ultrasound-guided biopsies:  - Positive for malignant cells, metastatic ductal carcinoma.     ER positive  NM positive  HER2 negative    ASSESSMENT:  Lashell West is a 43 year old woman with RIGHT breast cancer.    Her stage is:   Cancer  Staging   Malignant neoplasm of overlapping sites of right breast in female, estrogen receptor positive (H)  Staging form: Breast, AJCC 8th Edition  - Clinical: Stage IIA (cT3, cN1, cM0, G2, ER+, HI+, HER2-) - Signed by Karen Fernandez MD on 6/2/2023     I personally reviewed the imaging above with our in-house breast radiologist.  I am concerned about an asymmetrically enlarged RIGHT internal mammary node seen on her initial breast MRI. We discussed that this node is not typically removed in breast cancer surgery. Given the presence of the RIGHT IM node on imaging and the biopsy proven RIGHT axillary node, adjuvant radiation will be recommended.  I reviewed this imaging with Lashell West today.    The diagnosis and management of locoregionally advanced breast cancer was discussed with Lashell West. She is scheduled to complete neoadjuvant systemic therapy on  6/8/2023. We reviewed that surgical resection is still recommended following neoadjuvant therapy, in the form of either breast conservation (segmental mastectomy plus radiation) or mastectomy.  Surgery is performed 4-6 weeks following completion of systemic therapy.  Lashell West IS a candidate for breast conservation therapy but she is interested in bilateral mastectomy given her extensive family history.  This is very reasonable.    The risks of a mastectomy were discussed with the patient, including the risks of bleeding, wound infection, wound dehiscence, skin flap/nipple necrosis, and seroma formation.   Given the need for adjuvant radiation therapy, immediate reconstruction is typically not recommended. Lashell West was interested in delayed reconstruction; a Plastic Surgery consultation was offered and will be arranged.     In addition to the surgical management of the breast, her axilla must be addressed.  She was initially found to be node positive.  Currently, the adenopathy IS NOT palpable.  I recommended a repeat RIGHT  axillary US today. Should she be clinically node negative status following neoadjuvant systemic therapy, she would be a candidate for sentinel lymph node biopsy. This is performed with the combination of the radioactive Tilmanocept and dye (lymphazurin or indocyanine green). The risks of a sentinel lymph node biopsy were discussed with the patient, including the risks of lymphedema (5-10%), bleeding, wound infection, wound dehiscence, seroma formation, and paresthesias. There is an approximately 10% false negative rate associated with sentinel lymph node biopsy as published in the literature.      She had a biopsy clip placed in the axillary node at the time of needle biopsy.  We reviewed that 85% of the time, the sentinel lymph node is the clipped node. However, because of the potential for it not to map, I have recommended an radiofrequency identification (RFID) seed-localization of that clipped node to facilitate its removal at the time of surgery. The seed would be placed prior to surgery with image guidance.    We discussed that surgical pathology results will be reviewed at the postoperative visit to allow for careful discussion of next steps and for answering questions.    I asked Lashell West to touch base with her medical oncologist regarding whether the vascular access port can be removed at the time of surgery.  She would like to have this done. The risks and benefits of a port removal were discussed, including bleeding, wound infection, and wound dehiscence.   This will be done through the mastectomy incision.    All of the above was discussed with Lashell West and all questions were answered.  She elected to proceed with BILATERAL simple mastectomy and will undergo RIGHT axillary US today.    Total time spent with the patient was 80 minutes, of which 75% was counseling.     PLAN:  BILATERAL simple mastectomy  RIGHT axillary US today to determine axillary surgery  Will need adjuvant radiation  given right internal mammary node and right axillary jhonathan involvement  Plan for surgery 4-6 weeks after last cycle of chemotherapy (7/6 to 7/20)  Do not access port on the day of surgery  Patient will discuss with Dr Degroot whether her port can be removed at the time of surgery  Plastic surgery referral for delayed reconstruction  Radiation oncology referral will be needed post-surgery    Karen Fernandez MD MS [facility] FACS  Associate Professor of Surgery  Division of Surgical Oncology  [facility]     ADDENDUM:  I did speak with Dr Hays today after Lashell Ascenciondhenok's RIGHT axillary US.  I also spoke with Lashell West regarding the findings. There is mild cortical thickening and it has significantly decreased in size. No other abnormal nodes were seen.  We therefore discussed, RFID seed-localized axillary sentinel lymph node mapping and biopsy as the jhonathan surgery option for Lashell West.  She elected to proceed with BILATERAL simple mastectomy and RIGHT RFID seed-localized axillary sentinel lymph node mapping and biopsy. She had her RFID seed placed today by Dr Hays.    Karen Fernandez MD MS [facility] FACS  Associate Professor of Surgery  Division of Surgical Oncology  [facility]     100 minutes spent on the date of the encounter doing chart review, review of outside records, review of test results, interpretation of tests, patient visit, documentation and discussion with other provider(s).

## 2023-06-02 NOTE — NURSING NOTE
"  Oncology Rooming Note    June 2, 2023 12:18 PM   Lashell West is a 43 year old female who presents for:    Chief Complaint   Patient presents with     Oncology Clinic Visit     NEW EVAL: Breast cancer     Initial Vitals: /85   Pulse 105   Temp 98.2  F (36.8  C) (Oral)   Ht 1.752 m (5' 8.98\")   Wt 115.4 kg (254 lb 8 oz)   SpO2 97%   BMI 37.61 kg/m   Estimated body mass index is 37.61 kg/m  as calculated from the following:    Height as of this encounter: 1.752 m (5' 8.98\").    Weight as of this encounter: 115.4 kg (254 lb 8 oz). Body surface area is 2.37 meters squared.  Moderate Pain (4) Comment: Data Unavailable   No LMP recorded. Patient has had an ablation.  Allergies reviewed: Yes  Medications reviewed: Yes    Medications: Medication refills not needed today.  Pharmacy name entered into Snap[employer]: [id number] - [place], [place] - [address] [facility] [address]    Clinical concerns: None    Aixa Gabriel              "

## 2023-06-02 NOTE — PROGRESS NOTES
NEW SURGICAL CONSULTATION  Jun 2, 2023    Lashell eWst is a 43 year old woman who presents with a right  breast complaint.  She was referred by John Degroot MD.    HPI:    She noted no symptoms. She notes no masses in either breast, axilla, or neck. She denies any nipple discharge or nipple inversion.     Imaging in December 2022 showed a 2.3 cm mass at 3:30 6 cm FN, a 9 mm mass at 3:30 8 cm FN and also a 5 mm mass at 3:30 9 cm FN, all in the RIGHT breast. In addition, a 3 mm cyst was seen at 6:00 5 cm FN in the RIGHT breast. Also, a 2.9 cm RIGHT axillary node was seen.    A biopsy was performed of the 2.3 cm mass at 3:30 6 cm FN and a clip was placed.  It showed invasive ductal carcinoma.  A biopsy was performed of the 1.6 cm mass at 3:30 8 cm FN and a clip was placed.  It showed invasive ductal carcinoma.    A biopsy was performed of the RIGHT axillary mass and a clip was placed.  It showed metastatic ductal carcinoma      Breast MRI in January 2023 (per report) showed two adjacent enhancing masses in the RIGHT medial breast and satellites anterior and posterior to the two main masses. Total abnormal aread was 9 cm. Enlarged right axillary node was seen.    She met with Dr Degroot and began treatment with neoadjuvant AC (1/26/2023 to 3/9/2023) followed by paclitaxel (started 3/23/2023)    She is tolerating the taxol relatively well. Energy OK. Some neuropathy.  No treatment breaks.  No issues with her port since it was placed.  She did have to have two procedures for it.  The first time her port placement was attempted, she had issues and did not have enough sedation.  The port is located on the LEFT chest wall.    BREAST-SPECIFIC HISTORY:  Prior breast surgeries: No  Prior radiation history: No  Bra size: 38 I  Dominant hand: Right    FAMILY HISTORY:  Genetic testing was completed in February 2023 at [facility] - the clinic note from [facility] was reviewed. The results were  negative for pathogenic variants in the [employer] Germline 52 gene panel.    Breast ca: Yes - sister (dx 41), mat aunts x 3, mother (dx 40), MGM (dx 50), pat aunt, PGM  Ovarian ca: No  Pancreatic ca: No  Melanoma: No  Gastric ca: No  Colon ca: No  Other cancer: Yes son w/ thyroid ca    Patient Active Problem List   Diagnosis     Asthma     Eating disorder     Family history of malignant neoplasm of breast     Family history of malignant neoplasm of ovary     Generalized anxiety disorder     Major depressive disorder, single episode, severe (H)     Panic attacks     Recurrent urinary tract infection     Right upper quadrant pain     Rosacea     Seasonal allergies    No HTN, DM, MI, CVA  Exercise-induced asthma - no hospitalization    No past medical history on file.    No past surgical history on file.   PONV    Current Outpatient Medications   Medication Sig Dispense Refill     acetaminophen (TYLENOL) 325 MG tablet Take 650 mg by mouth every 4 hours as needed       albuterol (PROAIR HFA/PROVENTIL HFA/VENTOLIN HFA) 108 (90 Base) MCG/ACT inhaler Inhale 2 puffs into the lungs 4 times daily as needed       atenolol (TENORMIN) 25 MG tablet Take 12.5 mg by mouth daily       cetirizine HCl (ZYRTEC ALLERGY) 10 MG CAPS Take 10 mg by mouth daily       gabapentin (NEURONTIN) 300 MG capsule Take 300 mg by mouth 2 times daily       K-Phos-Neutral [id number] MG tablet Take 2 tablets by mouth 2 times daily (with meals)       Ketamine HCl 100 MG/100ML SOLN Inject 100 mg into the vein every 14 days       levothyroxine (SYNTHROID/LEVOTHROID) 100 MCG tablet Take 100 mcg by mouth daily       lithium (ESKALITH) 600 MG capsule Take 600 mg by mouth At Bedtime       loratadine 10 MG capsule Take 10 mg by mouth       ondansetron (ZOFRAN-ODT) 4 MG ODT tab Place 4 mg under the tongue       brexpiprazole (REXULTI) 1 MG tablet 3 mg (Patient not taking: Reported on 6/2/2023)       busPIRone HCl (BUSPAR) 30 MG tablet Take 30 mg  "by mouth 2 times daily (Patient not taking: Reported on 6/2/2023)       clonazePAM (KLONOPIN) 0.5 MG tablet Take 1 mg by mouth nightly as needed  (Patient not taking: Reported on 6/2/2023)       DULoxetine (CYMBALTA) 60 MG capsule Take 90 mg by mouth (Patient not taking: Reported on 6/2/2023)       EPINEPHrine (ANY BX GENERIC EQUIV) 0.3 MG/0.3ML injection 2-pack Inject 0.3 mg into the muscle as needed       hydrOXYzine (VISTARIL) 50 MG capsule Take 50 mg by mouth       methylPREDNISolone (MEDROL) 4 MG tablet Take 4 mg by mouth (Patient not taking: Reported on 6/2/2023)       prazosin (MINIPRESS) 2 MG capsule Take 2 mg by mouth (Patient not taking: Reported on 6/2/2023)       prazosin (MINIPRESS) 5 MG capsule Take 5 mg by mouth (Patient not taking: Reported on 6/2/2023)       traZODone (DESYREL) 100 MG tablet Take 100 mg by mouth (Patient not taking: Reported on 6/2/2023)             Allergies   Allergen Reactions     Latex Rash     Morphine And Related Anaphylaxis     Paxil [Paroxetine] Visual Disturbance     psycho   Steristrips and tegaderm OK  Skin glue OK    SOCIAL HISTORY:  Smokes: No - quit Feb 2023  Occupation: Does not currently work - on disability for mental health    ROS:  Easy bruising/bleeding: No  History of DVT/PE: No    /85   Pulse 105   Temp 98.2  F (36.8  C) (Oral)   Ht 1.752 m (5' 8.98\")   Wt 115.4 kg (254 lb 8 oz)   SpO2 97%   BMI 37.61 kg/m     Physical Exam  Constitutional:       Appearance: She is well-developed.   Chest:   Breasts:     Breasts are symmetrical.      Right: No inverted nipple, mass, nipple discharge, skin change or tenderness.      Left: No inverted nipple, mass, nipple discharge, skin change or tenderness.          Comments: Patient was examined in both supine and upright positions.   Lymphadenopathy:      Cervical: No cervical adenopathy.      Right cervical: No superficial, deep or posterior cervical adenopathy.     Left cervical: No superficial, deep or " posterior cervical adenopathy.      Upper Body:      Right upper body: No supraclavicular, axillary or pectoral adenopathy.      Left upper body: No supraclavicular, axillary or pectoral adenopathy.      Comments: No lymphedema in bilateral upper extremities.   Skin:     General: Skin is warm and dry.        INVESTIGATIONS:    Bilateral Breast MRI from [facility] (1/9/2023) showed:  FINDINGS: The patient's mammogram shows heterogeneously dense breast tissue. The breast MRI shows moderate background parenchymal enhancement.   There are 2 adjacent enhancing masses in the medial right breast mid and posterior depth, which correspond with the ultrasound biopsied masses. There is bridging enhancement between the masses on the breast MRI. There is also a 0.7 cm enhancing satellite lesion just 1 cm more anterior and slightly lateral to the larger anterior mass. A second small 0.6 cm enhancing satellite lesion is noted at the posterior edge of the more posterior mass, inferior edge. This was demonstrated by ultrasound. There are also several smaller more subtle foci of enhancement in the inferior subareolar region 3 cm deep to the nipple which could also represent additional disease. The main mass and 2 adjacent satellite lesions span at least 7 cm in AP diameter. Including the more anterior lesions it would span over 9 cm. No other suspicious areas of enhancement in the right breast.   There are 2 small well-circumscribed areas noted in the sagittal image which do not meet threshold enhancement and have a low suspicion appearance, likely small fibroadenomas. These are located in the central superior position and measure 6 and 9 mm in greatest diameter.   The enlarged right axillary lymph node is also demonstrated with an adjacent signal void from the biopsy marking clip. There is a small lymph node anterior and lateral to the biopsied node, which has questionable thickening of the cortex and measures 0.8 x 0.5 cm.  Other lymph nodes overall show normal morphology.   No suspicious areas of enhancement in the left breast. The left axillary lymph nodes show normal morphology. Also a cyst in the subareolar left breast as demonstrated by previous ultrasound.   IMPRESSION:   1.  Two adjacent enhancing masses in the medial right breast with contiguous enhancement between the masses. Also small satellite lesions just anterior and posterior to the 2 masses. There are also several small areas of enhancement in the more anterior inferior subareolar right breast which are suspicious for additional disease. The abnormal areas of enhancement extend at least 9 cm from anterior to posterior.   2.  Two small well-circumscribed lesions noted in the central superior right breast consistent with benign lesions such as small fibroadenomas.   3.  Enlarged right axillary lymph node and equivocal thickening of the cortex of a smaller lymph node located anterior and lateral to the biopsied node. Other lymph nodes retain normal morphology.   4.  Negative left breast. Left axillary lymph nodes appear normal.   BI-RADS 6: Known biopsy-proven malignancy.    Ultrasound from [facility] (12/7/2022) showed:  FINDINGS:   Right breast: At 3:30, 6 cm from the nipple there is an irregularly marginated 2.3 x 1.2 x 1.7 cm hypoechoic mass. At 3:30, 8 cm from the nipple there is a 9 x 7 x 7 mm irregularly marginated hypoechoic mass. This resides approximately 2 cm from the aforementioned lesion. At 3:30, 9 cm from the nipple there is a small satellite nodule measuring 4 x 3 x 5 mm.   Within the right breast at 6 o'clock, 5 cm from the nipple there is a small cyst measuring 3 x 3 mm.   Within the right axilla there is a lymph node exhibiting cortical enlargement with slight lobulation measuring 2.1 x 1.6 x 2.9 cm.   Left breast: Subareolar cysts are identified at 12 o'clock measuring 11 x 6 x 10 and 3 x 2 x 3 and at 9 o'clock, 3 x 3 x 3 mm.   IMPRESSION: The  findings were reviewed with the patient in detail. The right breast 3:30, 6 o'clock and 8-9 o'clock foci are suspicious for underlying malignancy. The axillary lymph node is also suspicious.   RECOMMENDATION: Ultrasound-guided biopsy of the 3:30, 6 and 8 cm from nipple foci. Additional ultrasound-guided biopsy right axillary lymph node.   BI-RADS 5: Highly suggestive of malignancy.     Diagnostic Mammogram from [facility] (12/7/2022) showed:  BREAST DENSITY: The breasts are heterogeneously dense, which may obscure small masses.   FINDINGS: Right breast: Spot compression CC and oblique views demonstrate persistence of an irregularly marginated triangular structure measuring approximately 8 x 10 mm residing within the 3-4 o'clock region of the breast 8 cm from the nipple. Dorsal to this focus there is a less apparent but evident on CC imaging ovoid area of partially circumscribed density measuring 7 mm.   Left breast: Spot compression views in the CC and oblique projections of the subareolar region demonstrate persistence of a circumscribed 7 mm mass.   IMPRESSION: Persistent asymmetries/masses both breasts.   RECOMMENDATIONS: Same-day targeted right and left breast ultrasound   BI-RADS 0: Incomplete. Needs additional imaging evaluation and/or prior mammograms for comparison.     Biopsy from [facility] (12/20/2022) showed:  Final Dx     A.  Breast, right, ultrasound guided biopsies at the 330 o'clock position 6 cm from the nipple:  - Invasive ductal carcinoma, see synoptic report     B.  Breast, right, ultrasound guided biopsies at the 330 o'clock position 8 cm from the nipple:  - Invasive ductal carcinoma, see synoptic report     C.  Lymph node, right axillary, ultrasound-guided biopsies:  - Positive for malignant cells, metastatic ductal carcinoma.     ER positive  MO positive  HER2 negative    ASSESSMENT:  Lashell West is a 43 year old woman with RIGHT breast cancer.    Her stage is:   Cancer  Staging   Malignant neoplasm of overlapping sites of right breast in female, estrogen receptor positive (H)  Staging form: Breast, AJCC 8th Edition  - Clinical: Stage IIIB (cT3, cN3, cM0, G2, ER+, IA+, HER2-) - Signed by Karen Fernandez MD on 6/6/2023     I personally reviewed the imaging above with our in-house breast radiologist.  I am concerned about an asymmetrically enlarged RIGHT internal mammary node seen on her initial breast MRI. We discussed that this node is not typically removed in breast cancer surgery. Given the presence of the RIGHT IM node on imaging and the biopsy proven RIGHT axillary node, adjuvant radiation will be recommended.  I reviewed this imaging with Lashell West today.    The diagnosis and management of locoregionally advanced breast cancer was discussed with Lashell West. She is scheduled to complete neoadjuvant systemic therapy on  6/8/2023. We reviewed that surgical resection is still recommended following neoadjuvant therapy, in the form of either breast conservation (segmental mastectomy plus radiation) or mastectomy.  Surgery is performed 4-6 weeks following completion of systemic therapy.  Lashell West IS a candidate for breast conservation therapy but she is interested in bilateral mastectomy given her extensive family history.  This is very reasonable.    The risks of a mastectomy were discussed with the patient, including the risks of bleeding, wound infection, wound dehiscence, skin flap/nipple necrosis, and seroma formation.   Given the need for adjuvant radiation therapy, immediate reconstruction is typically not recommended. Lashell West was interested in delayed reconstruction; a Plastic Surgery consultation was offered and will be arranged.     In addition to the surgical management of the breast, her axilla must be addressed.  She was initially found to be node positive.  Currently, the adenopathy IS NOT palpable.  I recommended a repeat  RIGHT axillary US today. Should she be clinically node negative status following neoadjuvant systemic therapy, she would be a candidate for sentinel lymph node biopsy. This is performed with the combination of the radioactive Tilmanocept and dye (lymphazurin or indocyanine green). The risks of a sentinel lymph node biopsy were discussed with the patient, including the risks of lymphedema (5-10%), bleeding, wound infection, wound dehiscence, seroma formation, and paresthesias. There is an approximately 10% false negative rate associated with sentinel lymph node biopsy as published in the literature.      She had a biopsy clip placed in the axillary node at the time of needle biopsy.  We reviewed that 85% of the time, the sentinel lymph node is the clipped node. However, because of the potential for it not to map, I have recommended an radiofrequency identification (RFID) seed-localization of that clipped node to facilitate its removal at the time of surgery. The seed would be placed prior to surgery with image guidance.    We discussed that surgical pathology results will be reviewed at the postoperative visit to allow for careful discussion of next steps and for answering questions.    I asked Lashell West to touch base with her medical oncologist regarding whether the vascular access port can be removed at the time of surgery.  She would like to have this done. The risks and benefits of a port removal were discussed, including bleeding, wound infection, and wound dehiscence.   This will be done through the mastectomy incision.    All of the above was discussed with Lashell West and all questions were answered.  She elected to proceed with BILATERAL simple mastectomy and will undergo RIGHT axillary US today.    Total time spent with the patient was 80 minutes, of which 75% was counseling.     PLAN:  1. BILATERAL simple mastectomy  2. RIGHT axillary US today to determine axillary surgery  3. Will need  adjuvant radiation given right internal mammary node and right axillary jhonathan involvement  4. Plan for surgery 4-6 weeks after last cycle of chemotherapy (7/6 to 7/20)  5. Do not access port on the day of surgery  6. Patient will discuss with Dr Degroot whether her port can be removed at the time of surgery  7. Plastic surgery referral for delayed reconstruction  8. Radiation oncology referral will be needed post-surgery    Karen Fernandez MD MS [facility] FACS  Associate Professor of Surgery  Division of Surgical Oncology  [facility]     ADDENDUM:  I did speak with Dr Hays today after Lashell West's RIGHT axillary US.  I also spoke with Lashell Ascenciondhenok regarding the findings. There is mild cortical thickening and it has significantly decreased in size. No other abnormal nodes were seen.  We therefore discussed, RFID seed-localized axillary sentinel lymph node mapping and biopsy as the jhonathan surgery option for Lashell West.  She elected to proceed with BILATERAL simple mastectomy and RIGHT RFID seed-localized axillary sentinel lymph node mapping and biopsy. She had her RFID seed placed today by Dr Hays.    Karen Fernandez MD MS [facility] FACS  Associate Professor of Surgery  Division of Surgical Oncology  [facility]     100 minutes spent on the date of the encounter doing chart review, review of outside records, review of test results, interpretation of tests, patient visit, documentation and discussion with other provider(s).

## 2023-06-02 NOTE — PROGRESS NOTES
[facility]: Surgical Oncology Plan of Care Education Note                                     Discussion with Patient:                                                         Met with Lashell following her visit with Dr. Fernandez on 6/2/2023. Introduced self and explained role of RNCC.       Plan:                                                       Reviewed plan for bilateral mastectomy without immediate reconstruction at the [facility]. Discussed need for H&P within 30 days of surgery. Lashell will be scheduled for a PAC video visit. Reviewed shower instructions and provided/mailed written hand-out and bottle of surgical scrub.     Drain instructions were reviewed and provided to patient. Lashell will confirm with her oncologist if her port can be removed at the time of surgery.      Surgery will be schedule 4-6 weeks after Lashell's last chemo infusion on 6/8.     A Plastics referral for Dr. Stinson has been placed to discuss delayed reconstruction.     Informed patient they should get a call within the next three business days from our OR  with surgery date, H&P date and date of post-op visit with their surgeon. Writer answered all questions and concerns to the best of her ability and provided her contact information. Reviewed use of Neocleus as alternative way to contact team.  Encouraged patient to contact with questions.         Rachel Lemus, RNCC  [facility]  Surgical Onolcogy      Approximately 20 minutes was spent in discussion with patient.

## 2023-06-05 ENCOUNTER — TELEPHONE (OUTPATIENT)
Dept: ONCOLOGY | Facility: CLINIC | Age: 43
End: 2023-06-05
Payer: COMMERCIAL

## 2023-06-05 ENCOUNTER — HOSPITAL ENCOUNTER (OUTPATIENT)
Facility: CLINIC | Age: 43
End: 2023-06-05
Attending: SURGERY | Admitting: SURGERY
Payer: COMMERCIAL

## 2023-06-05 NOTE — TELEPHONE ENCOUNTER
Left voicemail for patient regarding scheduling surgery with Dr. Fernandez.    Provided contact number to discuss.    P: [phone]    __    Kaci Morton, on 6/5/2023 at 12:10 PM

## 2023-06-06 NOTE — TELEPHONE ENCOUNTER
FUTURE VISIT INFORMATION      SURGERY INFORMATION:    Date: 7/10/23    Location: [facility] or    Surgeon:  Karen Fernandez MD    Anesthesia Type:  General with block    Procedure: BILATERAL Simple Mastectomy, RIGHT Radiofrequency Identification Seed-Localized Axillary [place] Lymph Node Biopsy    Consult: ov     RECORDS REQUESTED FROM:       Primary Care Provider: Beka Carias    Pertinent Medical History:    Most recent EKG+ Tracin21    Most recent ECHO: 23- [facility]

## 2023-06-07 ENCOUNTER — TELEPHONE (OUTPATIENT)
Dept: ONCOLOGY | Facility: CLINIC | Age: 43
End: 2023-06-07

## 2023-06-07 ENCOUNTER — PRE VISIT (OUTPATIENT)
Dept: PLASTIC SURGERY | Facility: CLINIC | Age: 43
End: 2023-06-07

## 2023-06-07 NOTE — TELEPHONE ENCOUNTER
RN Care Coordinator: Rachel Lemus RN     Surgery is scheduled with Dr. Karen Fernandez  Date: 7/10/2023   Location: [facility].  Scheduled per surgeon requested timeframe    Nuc Med injection scheduled to be delivered to the OR for surgeon to inject.    H&P to be completed by PAC clinic on 6/27/2023 at 9:00am via video     COVID-19 test: n/a    Post-op: 7/27/2023 at 10:15am, in person visit    Patient will receive surgery arrival and start time from PAC.     Spoke with the patient and was able to confirm all scheduled information.     The surgery packet was provided by the RNCC    Surgery and appointment overview was sent via [employer]    ______________    Kaci Morton on 6/7/2023 at 2:54 PM  P: [phone]

## 2023-06-08 NOTE — TELEPHONE ENCOUNTER
FUTURE VISIT INFORMATION      SURGERY INFORMATION:    Date: 7/10/23    Location: [facility] or    Surgeon:  Karen Fernandez MD    Anesthesia Type:  General with Block    Procedure: BILATERAL Simple Mastectomy, RIGHT Radiofrequency Identification Seed-Localized Axillary [place] Lymph Node Biopsy    RECORDS REQUESTED FROM:       Primary Care Provider: Janusz    Most recent ECHO: 1/17/23- [facility]

## 2023-06-09 ENCOUNTER — PREP FOR PROCEDURE (OUTPATIENT)
Dept: ONCOLOGY | Facility: CLINIC | Age: 43
End: 2023-06-09
Payer: COMMERCIAL

## 2023-06-09 ENCOUNTER — CARE COORDINATION (OUTPATIENT)
Dept: ONCOLOGY | Facility: CLINIC | Age: 43
End: 2023-06-09
Payer: COMMERCIAL

## 2023-06-09 NOTE — PROGRESS NOTES
Received MyChart message from Lashell West that her oncologist Dr Degroot is fine with the port being removed at the time of surgery. Will update upcoming surgery to BILATERAL simple mastectomy, RIGHT RFID seed-localized axillary sentinel lymph node mapping and biopsy, LEFT vascular access port removal.    Karen Fernandez MD MS [facility] FACS  Associate Professor of Surgery  Division of Surgical Oncology  [facility]

## 2023-06-09 NOTE — TELEPHONE ENCOUNTER
FUTURE VISIT INFORMATION      FUTURE VISIT INFORMATION:    Date: 6/14/23    Time: 7:30am    [place] [facility]  REFERRAL INFORMATION:    Referring providers clinic:  [facility]    Reason for visit/diagnosis  Discuss breast reconstruction, referral from Dr Fernandez. Pt is having surgery on 7/10 and will need radiation afterwards so Dr Fernandez recommends delayed reconstruction.    RECORDS REQUESTED FROM:       Clinic name Comments Records Status Imaging Status   [facility] Ov/referral 6/2/23 epic

## 2023-06-12 ENCOUNTER — PATIENT OUTREACH (OUTPATIENT)
Dept: ONCOLOGY | Facility: CLINIC | Age: 43
End: 2023-06-12
Payer: COMMERCIAL

## 2023-06-12 DIAGNOSIS — C50.811 MALIGNANT NEOPLASM OF OVERLAPPING SITES OF RIGHT BREAST IN FEMALE, ESTROGEN RECEPTOR POSITIVE (H): Primary | ICD-10-CM

## 2023-06-12 DIAGNOSIS — Z17.0 MALIGNANT NEOPLASM OF OVERLAPPING SITES OF RIGHT BREAST IN FEMALE, ESTROGEN RECEPTOR POSITIVE (H): Primary | ICD-10-CM

## 2023-06-12 NOTE — PROGRESS NOTES
New Patient Oncology Nurse Navigator Note     Referring provider: Dr. Karen Fernandez    Referring Clinic/Organization: [facility]     Referred to (specialty:) Radiation Oncology     Requested provider (if applicable): Dr. Amy Wharton     Date Referral Received: June 12, 2023     Evaluation for:  Breast cancer     Clinical History (per Nurse review of records provided):    Please see early workup for breast cancer in writer's 5/24/23 note.    Bilateral simply mastectomy is planned for 7/10/23    Will need adjuvant radiation given right internal mammary node and right axillary jhonathan involvement.     Schedule with Dr. Wharton on 7/27 at 11:00 at the [facility] to follow patient's post-op with Dr. Fernandez.    6/13 9:01 - Telephoned and spoke with Sal Latif received referral, reviewed for appropriate plan, and call transferred to New Patient Scheduling for completion.      New Patient Scheduling notes indicate patient has transferred her care to [facility] due to insurance issues..

## 2023-06-13 DIAGNOSIS — Z17.0 MALIGNANT NEOPLASM OF OVERLAPPING SITES OF RIGHT BREAST IN FEMALE, ESTROGEN RECEPTOR POSITIVE (H): Primary | ICD-10-CM

## 2023-06-13 DIAGNOSIS — C50.811 MALIGNANT NEOPLASM OF OVERLAPPING SITES OF RIGHT BREAST IN FEMALE, ESTROGEN RECEPTOR POSITIVE (H): Primary | ICD-10-CM

## 2023-06-14 ENCOUNTER — PATIENT OUTREACH (OUTPATIENT)
Dept: ONCOLOGY | Facility: CLINIC | Age: 43
End: 2023-06-14

## 2023-06-14 ENCOUNTER — PRE VISIT (OUTPATIENT)
Dept: PLASTIC SURGERY | Facility: CLINIC | Age: 43
End: 2023-06-14

## 2023-06-14 ENCOUNTER — OFFICE VISIT (OUTPATIENT)
Dept: PLASTIC SURGERY | Facility: CLINIC | Age: 43
End: 2023-06-14
Payer: COMMERCIAL

## 2023-06-14 VITALS
WEIGHT: 253.3 LBS | SYSTOLIC BLOOD PRESSURE: 115 MMHG | HEIGHT: 69 IN | OXYGEN SATURATION: 96 % | HEART RATE: 95 BPM | DIASTOLIC BLOOD PRESSURE: 77 MMHG | BODY MASS INDEX: 37.52 KG/M2

## 2023-06-14 DIAGNOSIS — Z17.0 MALIGNANT NEOPLASM OF OVERLAPPING SITES OF RIGHT BREAST IN FEMALE, ESTROGEN RECEPTOR POSITIVE (H): ICD-10-CM

## 2023-06-14 DIAGNOSIS — C50.811 MALIGNANT NEOPLASM OF OVERLAPPING SITES OF RIGHT BREAST IN FEMALE, ESTROGEN RECEPTOR POSITIVE (H): ICD-10-CM

## 2023-06-14 PROCEDURE — 99215 OFFICE O/P EST HI 40 MIN: CPT | Performed by: STUDENT IN AN ORGANIZED HEALTH CARE EDUCATION/TRAINING PROGRAM

## 2023-06-14 ASSESSMENT — PAIN SCALES - GENERAL: PAINLEVEL: MODERATE PAIN (4)

## 2023-06-14 NOTE — LETTER
"2023       RE: Lashell West  [address] [facility] [address]     Dear Colleague,    Thank you for referring your patient, Lashell West, to the [facility] [place] [facility]. Please see a copy of my visit note below.    PRS    HPI: 43 year-old with R breast cancer, enlarged lymph nodes including substernally with plans for bilateral mastectomies with sentinel lymph node biopsy and adjuvant radiation. No plans for chemotherapy at this time. She is interested in breast reconstruction. No miscarriages. No history of DVT/PE.     ROS: Negative, see HPI  PMH: R breast cancer, asthma, depression, nondiabetic  PSH: None to the breasts or chest. Two low-transverse  sections, bladder repair, laparoscopic uterine ablation  Medications: No blood thinners, no immune suppression  Allergies: Latex, Morphine, Paxil  SH: Quit smoking in February after smoking daily for 4 years  FH: No bleeding or clotting issues, or problems with anesthesia. Extensive family history of breast cancer including both grandmothers, several aunts and sister, but negative genetic screening.    Examination:  /77   Pulse 95   Ht 1.752 m (5' 8.98\")   Wt 114.9 kg (253 lb 4.8 oz)   SpO2 96%   BMI 37.43 kg/m    Nonlabored breathing  Not distressed  Abdominal skin excess with lipodystrophy that would be amenable to delayed abdominally-based free flap reconstruction    A/P: 43F hx R breast cancer, +lymph nodes w plans for XRT, seeking breast reconstruction    Explained that breast reconstruction is totally optional and patient can choose to remain flat post-mastectomy. There should be no coercion. Discussed the possibility of obtaining custom breast prosthetics. Discussed also considering delayed reconstruction (remaining flat and then deciding later for breast reconstruction).      Explained that the process for breast " reconstruction can take 9-12 months, with generally 3 months between stages. Patient asked questions, had these fully answered and understood that breast reconstruction is both optional as well as a multi-stage process.    Explained that radiation definitely changes paradigm for reconstruction since it increases the risks of complications related to wound healing or infection. In her case, the risks of implant-based reconstruction is far greater due to radiation plans. It can be done, but with high-risk for wound healing or infection complications. In general, we have to bring in healthy non-irradiated skin from another area of the body in most cases to help mitigate these risks.      In this case, patient would be a candidate for and my recommendation would be delayed abdominally-based free flap reconstruction at least 6 months after completing radiation treatment. Explained that my recommendation is to avoid implants in the setting of known plans for radiation. We will obtain a CTA Abdomen/Pelvis in the fall to evaluate for perforators so that I can better  patient on expected abdominal morbidity and whether she would benefit from reduced wound healing risk with an umbilectomy. Patient is agreeable to the plan. She will proceed with scheduled mastectomies and no immediate reconstruction. She will return to my clinic a few months after completing radiation treatment. All questions answered.     A total of 60 minutes was devoted to review of chart, direct face-to-face patient counseling and documentation during this encounter    Seymour Stinson MD, PhD

## 2023-06-14 NOTE — PROGRESS NOTES
New Patient Oncology Nurse Navigator Note     Referring provider: Dr. Karen Fernandez     Referring Clinic/Organization: [facility]      Referred to (specialty:) Medical Oncology      Date Referral Received: June 14, 2023     Evaluation for:  Breast cancer     Clinical History (per Nurse review of records provided):      Second opinion for breast cancer. Patient has been receiving neoadjuvant chemotherapy at [facility].     Bilateral mastectomy with Dr. Fernandez planned for 7/10.    Patient resides in [place] and will return for follow up with Dr. Fenrandez and consult with Dr. Wharton on 7/27. 6/14 9:53 - Telephoned and spoke with Sonja to assist in scheduling consult. We discussed trying to incorporate her visit same day due to her travel time. Fortunately she is in the [facility] on 7/25 for a family event and can see Dr. Mckeon on 7/25 at 4:00pm. Writer received referral, reviewed for appropriate plan, and call warm transferred to New Patient Scheduling for completion.

## 2023-06-15 NOTE — PROGRESS NOTES
"PRS    HPI: 43 year-old with R breast cancer, enlarged lymph nodes including substernally with plans for bilateral mastectomies with sentinel lymph node biopsy and adjuvant radiation. No plans for chemotherapy at this time. She is interested in breast reconstruction. No miscarriages. No history of DVT/PE.     ROS: Negative, see HPI  PMH: R breast cancer, asthma, depression, nondiabetic  PSH: None to the breasts or chest. Two low-transverse  sections, bladder repair, laparoscopic uterine ablation  Medications: No blood thinners, no immune suppression  Allergies: Latex, Morphine, Paxil  SH: Quit smoking in February after smoking daily for 4 years  FH: No bleeding or clotting issues, or problems with anesthesia. Extensive family history of breast cancer including both grandmothers, several aunts and sister, but negative genetic screening.    Examination:  /77   Pulse 95   Ht 1.752 m (5' 8.98\")   Wt 114.9 kg (253 lb 4.8 oz)   SpO2 96%   BMI 37.43 kg/m    Nonlabored breathing  Not distressed  Abdominal skin excess with lipodystrophy that would be amenable to delayed abdominally-based free flap reconstruction    A/P: 43F hx R breast cancer, +lymph nodes w plans for XRT, seeking breast reconstruction    Explained that breast reconstruction is totally optional and patient can choose to remain flat post-mastectomy. There should be no coercion. Discussed the possibility of obtaining custom breast prosthetics. Discussed also considering delayed reconstruction (remaining flat and then deciding later for breast reconstruction).      Explained that the process for breast reconstruction can take 9-12 months, with generally 3 months between stages. Patient asked questions, had these fully answered and understood that breast reconstruction is both optional as well as a multi-stage process.    Explained that radiation definitely changes paradigm for reconstruction since it increases the risks of complications " related to wound healing or infection. In her case, the risks of implant-based reconstruction is far greater due to radiation plans. It can be done, but with high-risk for wound healing or infection complications. In general, we have to bring in healthy non-irradiated skin from another area of the body in most cases to help mitigate these risks.      In this case, patient would be a candidate for and my recommendation would be delayed abdominally-based free flap reconstruction at least 6 months after completing radiation treatment. Explained that my recommendation is to avoid implants in the setting of known plans for radiation. We will obtain a CTA Abdomen/Pelvis in the fall to evaluate for perforators so that I can better  patient on expected abdominal morbidity and whether she would benefit from reduced wound healing risk with an umbilectomy. Patient is agreeable to the plan. She will proceed with scheduled mastectomies and no immediate reconstruction. She will return to my clinic a few months after completing radiation treatment. All questions answered.     A total of 60 minutes was devoted to review of chart, direct face-to-face patient counseling and documentation during this encounter    Seymour Stinson MD, PhD

## 2023-06-27 ENCOUNTER — PRE VISIT (OUTPATIENT)
Dept: SURGERY | Facility: CLINIC | Age: 43
End: 2023-06-27

## 2023-06-27 ENCOUNTER — VIRTUAL VISIT (OUTPATIENT)
Dept: SURGERY | Facility: CLINIC | Age: 43
End: 2023-06-27
Payer: COMMERCIAL

## 2023-06-27 DIAGNOSIS — C50.811 MALIGNANT NEOPLASM OF OVERLAPPING SITES OF RIGHT BREAST IN FEMALE, ESTROGEN RECEPTOR POSITIVE (H): ICD-10-CM

## 2023-06-27 DIAGNOSIS — Z01.818 PREOP EXAMINATION: Primary | ICD-10-CM

## 2023-06-27 DIAGNOSIS — Z17.0 MALIGNANT NEOPLASM OF OVERLAPPING SITES OF RIGHT BREAST IN FEMALE, ESTROGEN RECEPTOR POSITIVE (H): ICD-10-CM

## 2023-06-27 PROCEDURE — 99203 OFFICE O/P NEW LOW 30 MIN: CPT | Mod: VID | Performed by: NURSE PRACTITIONER

## 2023-06-27 RX ORDER — TRAMADOL HYDROCHLORIDE 50 MG/1
50 TABLET ORAL PRN
COMMUNITY
Start: 2023-06-19

## 2023-06-27 RX ORDER — SCOLOPAMINE TRANSDERMAL SYSTEM 1 MG/1
1 PATCH, EXTENDED RELEASE TRANSDERMAL ONCE
Status: CANCELLED | OUTPATIENT
Start: 2023-06-27 | End: 2023-06-27

## 2023-06-27 RX ORDER — CALCIUM CARBONATE 500 MG/1
1 TABLET, CHEWABLE ORAL PRN
COMMUNITY
Start: 2023-06-27

## 2023-06-27 ASSESSMENT — PAIN SCALES - GENERAL: PAINLEVEL: MODERATE PAIN (4)

## 2023-06-27 ASSESSMENT — ENCOUNTER SYMPTOMS: ORTHOPNEA: 0

## 2023-06-27 NOTE — PATIENT INSTRUCTIONS
Preparing for Your Surgery      Name:  Lashell West   MRN:  [id number]   :  1980   Today's Date:  2023       Arriving for surgery:       Surgery date:  7/10/23  Arrival time:  12:15 pm    Please come to:    [facility] [address]  [address] [place]  [address]      The [facility] Patient /Visitor Ramp is located at [address]. Patients and visitors who self-park will receive the reduced hospital parking rate. If the Patient /Visitor Ramp is full, please follow the signs to the [email or web address] parking located at the main hospital entrance.     parking is available ( 24 hours/ 7 days a week)    Discounted parking pass options are available for patients and visitors. They can be purchased at the [employer] desk at the main hospital entrance.    -  Stop at the security desk and they will direct surgery patients to Registration, and then the 3rd floor Surgery Waiting Room. [phone]     -  If you are in need of directions, wheelchair or escort please stop at the Information/security desk in the lobby.       What can I eat or drink?  -  You may eat and drink normally up to 8 hours prior to arrival time. (Until 4:15 am)  -  You may have clear liquids until 2 hours prior to arrival time. (Until 10:15 am)    Examples of clear liquids:  Water  Clear broth  Juices (apple, white grape, white cranberry  and cider) without pulp  Noncarbonated, powder based beverages  (lemonade and Tony-Aid)  Sodas (Sprite, 7-Up, ginger ale and seltzer)  Coffee or tea (without milk or cream)  Gatorade    -  No Alcohol or cannabis products for at least 24 hours before surgery.     Which medicines can I take?  Hold Aspirin/Aspirin products (Excedrin Migraine) for 7 days before surgery.   Hold Multivitamins for 7 days before surgery.  Hold Supplements for 7 days before surgery.  Hold Ibuprofen (Advil, Motrin) for 1 day before surgery--unless  otherwise directed by surgeon.  Hold Naproxen (Aleve) for 4 days before surgery.     Hold Cetirizine (Zyrtec) for 24 hours prior to surgery. (Per [employer] GOLD.)    -  DO NOT take these medications the day of surgery:  TUMS    -  PLEASE TAKE these medications the day of surgery:  Levothyroxine (Synthroid)  Omeprazole (Prilosec)  Acetaminophen (Tylenol) if needed  Albuterol inhaler if needed  Ondansetron (Zofran) if needed  Tramadol if needed    How do I prepare myself?  - Bring Albuterol inhaler.  - Please take 2 showers (one the night prior to surgery and one the morning of surgery) using Scrubcare or Hibiclens soap.    Use this soap only from the neck to your toes.     Leave the soap on your skin for one minute--then rinse thoroughly.      You may use your own shampoo and conditioner. No other hair products.   - Please remove all jewelry and body piercings.  - No lotions, deodorants or fragrance.  - No makeup or fingernail polish.   - Bring your ID and insurance card.    -If you have a Deep Brain Stimulator, Spinal Cord Stimulator, or any Neuro Stimulator device---you must bring the remote control to the hospital.      ALL PATIENTS GOING HOME THE SAME DAY OF SURGERY ARE REQUIRED TO HAVE A RESPONSIBLE ADULT TO DRIVE AND BE IN ATTENDANCE WITH THEM FOR 24 HOURS FOLLOWING SURGERY.    Covid testing policy as of 12/06/2022  Your surgeon will notify and schedule you for a COVID test if one is needed before surgery--please direct any questions or COVID symptoms to your surgeon      Questions or Concerns:    - For any questions regarding the day of surgery or your hospital stay, please contact the Pre Admission Nursing Office at [phone].       - If you have health changes between today and your surgery, please call your surgeon.       - For questions after surgery, please call your surgeons office.           Current Visitor Guidelines    You may have 2 visitors in the pre op area.    Visiting hours: 8 a.m. to  8:30 p.m.    You may have four visitors during your inpatient hospital stay.    Patients confirmed or suspected to have symptoms of COVID 19 or flu:     No visitors allowed for adult patients.   Children (under age 18) can have 1 named visitor.     People who are sick or showing symptoms of COVID 19 or flu:    Are not allowed to visit patients--we can only make exceptions in special situations.       Please follow these guidelines for your visit:          Please maintain social distance          Masking is optional--however at times you may be asked to wear a mask for the safety of yourself and others     Clean your hands with alcohol hand . Do this when you arrive at and leave the building and patient room,    And again after you touch your mask or anything in the room.     Go directly to and from the room you are visiting.     Stay in the patient s room during your visit. Limit going to other places in the hospital as much as possible     Leave bags and jackets at home or in the car.     For everyone s health, please don t come and go during your visit. That includes for smoking   during your visit.

## 2023-06-27 NOTE — PROGRESS NOTES
Sonja is a 43 year old who is being evaluated via a billable video visit.      How would you like to obtain your AVS? MyChart  If the video visit is dropped, the invitation should be resent by: Text to cell phone: [phone]            HPI               Review of Systems           Physical Exam

## 2023-06-27 NOTE — H&P
Pre-Operative H & P     CC:  Preoperative exam to assess for increased cardiopulmonary risk while undergoing surgery and anesthesia.    Date of Encounter: 6/27/2023  Primary Care Physician:  Beka Carias     Reason for visit:   Encounter Diagnoses   Name Primary?     Preop examination Yes     Malignant neoplasm of overlapping sites of right breast in female, estrogen receptor positive (H)        HPI  Lashell West is a 43 year old female who presents for pre-operative H & P in preparation for  Procedure Information     Case: [id number] Date/Time: 07/10/23 1415    Procedures:       BILATERAL Simple Mastectomy, RIGHT Radiofrequency Identification Seed-Localized (Bilateral: Breast)      Axillary [place] Lymph Node Biopsy (Axilla)      Remove port vascular access (Left: Neck)    Anesthesia type: General with Block    Diagnosis: Malignant neoplasm of overlapping sites of right breast in female, estrogen receptor positive (H) [C50.811, Z17.0]    Pre-op diagnosis: Malignant neoplasm of overlapping sites of right breast in female, estrogen receptor positive (H) [C50.811, Z17.0]    Location:  OR [address] [place] [address] OR    Providers: Karen Fernandez MD          Lashell West is a 43 year old female with allergic rhinitis, asthma, obesity, hypothyroidism, neuropathy, migraines, depression, SHIRA, panic and PTSD that has right breast cancer.  She is s/p chemotherapy treatment at an outside facility.  She has consulted with Dr. Fernandez and Dr. Latham here for surgical consideration.  The above listed procedure has been recommended.  Radiation likely to follow.     History is obtained from the patient and chart review    Hx of abnormal bleeding or anti-platelet use: none    Menstrual history: No LMP recorded. Patient has had an ablation.:      Past Medical History  Past Medical History:   Diagnosis Date     Allergic rhinitis      Asthma      Depression      SHIRA (generalized anxiety disorder)      Hypothyroidism      Obesity       Panic attack      PONV (postoperative nausea and vomiting)      PTSD (post-traumatic stress disorder)        Past Surgical History  Past Surgical History:   Procedure Laterality Date     ARTHROSCOPY KNEE Right      C/SECTION, LOW TRANSVERSE      x2, 2005 and 2008     DISCECTOMY LUMBAR MINIMALLY INVASIVE TWO LEVELS       ENDOMETRIAL ABLATION      with bilateral fallopian tube removal     HAND SURGERY       PORTACATH PLACEMENT      x2       Prior to Admission Medications  Current Outpatient Medications   Medication Sig Dispense Refill     acetaminophen (TYLENOL) 325 MG tablet Take 650 mg by mouth every 4 hours as needed       albuterol (PROAIR HFA/PROVENTIL HFA/VENTOLIN HFA) 108 (90 Base) MCG/ACT inhaler Inhale 2 puffs into the lungs 4 times daily as needed       aspirin-acetaminophen-caffeine (EXCEDRIN MIGRAINE) 250-250-65 MG tablet Take 1 tablet by mouth daily as needed for headaches       calcium carbonate (TUMS) 500 MG chewable tablet Take 1 tablet (500 mg) by mouth as needed for heartburn       cetirizine HCl (ZYRTEC ALLERGY) 10 MG CAPS Take 10 mg by mouth daily       EPINEPHrine (ANY BX GENERIC EQUIV) 0.3 MG/0.3ML injection 2-pack Inject 0.3 mg into the muscle as needed       Ketamine HCl 100 MG/100ML SOLN Inject 100 mg into the vein every 14 days Last injection 05/31       levothyroxine (SYNTHROID/LEVOTHROID) 100 MCG tablet Take 100 mcg by mouth daily       omeprazole (PRILOSEC) 20 MG DR capsule Take 1 capsule (20 mg) by mouth daily       ondansetron (ZOFRAN-ODT) 4 MG ODT tab Take 4 mg by mouth as needed       traMADol (ULTRAM) 50 MG tablet Take 50 mg by mouth as needed for pain         Allergies  Allergies   Allergen Reactions     Latex Rash     Morphine And Related Anaphylaxis     Paxil [Paroxetine] Visual Disturbance     psycho       Social History  Social History     Socioeconomic History     Marital status:      Spouse name: Not on file     Number of children: 2     Years of education: Not on  "file     Highest education level: Not on file   Occupational History     Occupation: homemaker   Tobacco Use     Smoking status: Former     Packs/day: 0.50     Years: 4.00     Pack years: 2.00     Types: Cigarettes, Vaping Device     Quit date: 2023     Years since quittin.4     Smokeless tobacco: Never   Substance and Sexual Activity     Alcohol use: Yes     Alcohol/week: 1.0 standard drink of alcohol     Types: 1 Glasses of wine per week     Drug use: Never     Sexual activity: Not on file   Other Topics Concern     Parent/sibling w/ CABG, MI or angioplasty before 65F 55M? Not Asked   Social History Narrative     Not on file     Social Determinants of Health     Financial Resource Strain: Not on file   Food Insecurity: Not on file   Transportation Needs: Not on file   Physical Activity: Not on file   Stress: Not on file   Social Connections: Not on file   Intimate Partner Violence: Not on file   Housing Stability: Not on file       Family History  Family History   Problem Relation Age of Onset     Breast Cancer Mother      Unknown/Adopted Father      Pulmonary Embolism Maternal Grandfather      Anesthesia Reaction Other         PONV, slow to wake       Review of Systems  The complete review of systems is negative other than noted in the HPI or here.   Anesthesia Evaluation   Pt has had prior anesthetic.     History of anesthetic complications  - PONV.  slow to wake,  high tolerance.    ROS/MED HX  ENT/Pulmonary:     (+) MATTHEW risk factors, obese, allergic rhinitis, Intermittent, asthma Last exacerbation: \"a couple years\",  Treatment: Inhaler prn,   (-) sleep apnea and recent URI   Neurologic:     (+) peripheral neuropathy, - hands and feet. migraines,     Cardiovascular:     (+) -----FANG. Previous cardiac testing   Echo: Date: 2023 Results:  The left ventricular systolic function is normal.   Mildly abnormal diastolic function (Grade I), may be normal variant for age.   No significant valvular stenosis or " "insufficiency seen.   TR signal inadequate to allow accurate estimate RV systolic pressure.   There is no pericardial effusion.    Stress Test: Date: Results:    ECG Reviewed: Date: 2021 Results:  Sinus tachycardia   Possible Lateral infarct , age undetermined   Abnormal ECG   Cath: Date: Results:   (-) orthopnea/PND   METS/Exercise Tolerance: >4 METS Comment: Walks every day for exercise.  Was just on vacation and \"walked a lot more\" than normal.  Typically doesn't at least 3 miles per day.  Reports that she has chronically gotten short of breath and had tachycardia with walking, but doesn't have to stop to rest.   Hematologic:  - neg hematologic  ROS     Musculoskeletal: Comment: Acute on chronic low back pain           GI/Hepatic:     (+) GERD, Other,     Renal/Genitourinary:  - neg Renal ROS     Endo: Comment: Completed a medrol dosepak for back pain 1 week ago.    (+) thyroid problem, hypothyroidism, Obesity,     Psychiatric/Substance Use:     (+) psychiatric history anxiety and depression (panic attacks, PTSD)     Infectious Disease:  - neg infectious disease ROS     Malignancy:   (+) Malignancy, History of Breast.Breast CA Active status post Chemo.        Other:  - neg other ROS    (+) , H/O Chronic Pain,        Virtual visit -  No vitals were obtained    Physical Exam  Constitutional: Awake, alert, cooperative, no apparent distress, and appears stated age.  Eyes: Pupils equal  HENT: Normocephalic  Respiratory: non labored breathing   Neurologic: Awake, alert, oriented to name, place and time.   Neuropsychiatric: Calm, cooperative. Normal affect.      Prior Labs/Diagnostic Studies   All labs and imaging personally reviewed     EKG/ stress test - if available please see in ROS above       CHEMO HEME PROF  Order: [id number]   suggestion  Information displayed in this report may not trend or trigger automated decision support.      Ref Range & Units 2 wk ago   WBC 3.2 - 11.0 10*9/L 6.7    HGB 11.2 - 15.5 g/dL " 13.1    HCT 34.3 - 46.0 % 40.3    MCV 81.4 - 99.0 fL 92.0     - 375 10*9/L 206    ANC Calculation 1.5 - 7.6 10**9/L 4.7      Specimen Collected: 06/08/23 11:04 AM Last Resulted: 06/08/23 11:40 AM   Received From: [facility]  Result Received: 06/14/23 12:26 PM     View Encounter      Received Information   Contains abnormal data COMPREHENSIVE METABOLIC PANEL  Order: [id number]   suggestion  Information displayed in this report may not trend or trigger automated decision support.      Ref Range & Units 2 wk ago   Sodium 134 - 143 mEq/L 140    Potassium 3.4 - 5.1 mEq/L 3.9    Chloride 99 - 110 mEq/L 108    Carbon Dioxide 19 - 29 mEq/L 23    Anion Gap 3.0 - 15.0 mEq/L 9.0    Blood Urea Nitrogen 5 - 24 mg/dL 13    Creatinine 0.40 - 1.00 mg/dL 0.77    Glomerular Filtration Rate >60 mL/min/1.73 m*2 97    Comment: Risk of cardiovascular disease increases when GFR is abnormal; persistently reduced GFR values are a specific indication of CKD. This calculation uses CKD-EPI 2021 equation without adjustment for race; it has not been validated in pregnant women.   Calcium 8.4 - 10.5 mg/dL 9.2    Glucose 70 - 99 mg/dL 101 High     Protein, Total 6.0 - 8.0 g/dL 6.9    Albumin 3.5 - 5.0 g/dL 3.9    Alkaline Phosphatase 40 - 150 IU/L 94    Aspartate Aminotransferase 10 - 40 IU/L 23    Alanine Aminotransferase 6 - 31 IU/L 34 High     Bilirubin, Total 0.2 - 1.2 mg/dL 0.3    Narrative        The patient's records and results personally reviewed by this provider.     Outside records reviewed from: Care Everywhere      Assessment      Lashell West is a 43 year old female seen as a PAC referral for risk assessment and optimization for anesthesia.    Plan/Recommendations  Pt will be optimized for the proposed procedure.  See below for details on the assessment, risk, and preoperative recommendations    NEUROLOGY  - No history of TIA, CVA or seizure   - hold excedrin 7 days prior to  "surgery    - Chronic Pain - taking 2 tramadol daily for back pain.   .  -Post Op delirium risk factors:  No risk identified    ENT  - No current airway concerns.  Will need to be reassessed day of surgery.  Mallampati: Unable to assess  TM: Unable to assess    CARDIAC  - No history of CAD, Hypertension and Afib   - cardiac testing as above    - METS (Metabolic Equivalents)  Patient performs 4 or more METS exercise without symptoms            Total Score: 0      RCRI-Very low risk: Class 1 0.4% complication rate            Total Score: 0        PULMONARY    MATTHEW Low Risk            Total Score: 1    MATTHEW: BMI over 35 kg/m2      - Asthma  Well controlled  - Tobacco History      History   Smoking Status     Former     Packs/day: 0.50     Years: 4.00     Types: Cigarettes, Vaping Device     Quit date: 2/1/2023   Smokeless Tobacco     Never       GI  - GERD is managed with prn Tums and prn omeprazole.  Hold Tums DOS.    -scopolamine ordered for pre-op    PONV High Risk  Total Score: 4           1 AN PONV: Pt is Female    1 AN PONV: Patient is not a current smoker    1 AN PONV: Patient has history of PONV    1 AN PONV: Intended Post Op Opioids            ENDOCRINE    - BMI: Estimated body mass index is 37.43 kg/m  as calculated from the following:    Height as of 6/14/23: 1.752 m (5' 8.98\").    Weight as of 6/14/23: 114.9 kg (253 lb 4.8 oz).  Obesity (BMI >30)  - No history of Diabetes Mellitus    HEME  VTE High Risk 3%            Total Score: 9    VTE: Family Hx of VTE    VTE: Current cancer      - No history of abnormal bleeding or antiplatelet use.      MSK  - acute on chronic low back pain.  Following with ortho.  Completed a medrol dosepak last Thursday.  Taking 2 tabs of tramadol per day.  Consider cautious positioning.    PSYCH  - getting ketamine infusions monthly.  Last was 5/31/23, next is tomorrow.      Access  - left chest portacath      Different anesthesia methods/types have been discussed with the patient, " but they are aware that the final plan will be decided by the assigned anesthesia provider on the date of service.      The patient is optimized for their procedure. AVS with information on surgery time/arrival time, meds and NPO status given by nursing staff. No further diagnostic testing indicated.    Please refer to the physical examination documented by the anesthesiologist in the anesthesia record on the day of surgery.    Video-Visit Details    Type of service:  Video Visit    Provider received verbal consent for a Video Visit from the patient? Yes   Video Start Time: 0852  Video End Time:0914    Originating Location (pt. Location): Parked in their car    Distant Location (provider location):  Off-site  Mode of Communication:  Video Conference via [employer]  On the day of service:     Prep time: 8 minutes  Visit time: 22 minutes  Documentation time: 13 minutes  ------------------------------------------  Total time: 40 minutes      JOSE A Bettencourt CNP  Preoperative Assessment Center  [facility]  Clinic and Surgery Center  Phone: [phone]  Fax: [phone]

## 2023-07-03 LAB
PATH REPORT.COMMENTS IMP SPEC: ABNORMAL
PATH REPORT.COMMENTS IMP SPEC: YES
PATH REPORT.FINAL DX SPEC: ABNORMAL
PATH REPORT.GROSS SPEC: ABNORMAL
PATH REPORT.MICROSCOPIC SPEC OTHER STN: ABNORMAL
PATH REPORT.RELEVANT HX SPEC: ABNORMAL
PATH REPORT.RELEVANT HX SPEC: ABNORMAL
PATH REPORT.SITE OF ORIGIN SPEC: ABNORMAL

## 2023-07-06 ENCOUNTER — TELEPHONE (OUTPATIENT)
Dept: ONCOLOGY | Facility: CLINIC | Age: 43
End: 2023-07-06
Payer: COMMERCIAL

## 2023-07-06 NOTE — TELEPHONE ENCOUNTER
Received voicemail from patient on 7/6 regarding cancelling surgery with Dr. Fernandez on 7/10.    Unclear why patient was transferred to writer as writer does not coordinate for Dr. Fernandez.    Will route to appropriate team.  __    Katie Gonzales, Senior Perioperative Coordinator, on 7/6/2023  P: [phone]

## 2023-07-07 ENCOUNTER — PATIENT OUTREACH (OUTPATIENT)
Dept: ONCOLOGY | Facility: CLINIC | Age: 43
End: 2023-07-07
Payer: COMMERCIAL

## 2023-07-07 NOTE — PROGRESS NOTES
[facility]: Surgical Oncology Cancer Care Short Note                                     Discussion with Patient:                                                          OUTBOUND CALL:     Spoke with Sonja following message stating she was cancelling her surgery with Dr. Fernandez on 7/10. She stated she was contacted my [facility] Billing who let her know [facility] was her primary insurance provider, not her [facility] insurance, so she cannot have surgery at [facility] due to us not accepting [employer]. Sonja stated she is scheduled to see [facility] next week to plan her surgery.     Wish Sonja luck with her future care. Encouraged Sonja to reach out with any further questions or concerns.     Rachel Lemus, RNCC  [facility]   Surgical Oncology

## 2024-06-29 ENCOUNTER — HEALTH MAINTENANCE LETTER (OUTPATIENT)
Age: 44
End: 2024-06-29

## 2024-11-16 ENCOUNTER — HEALTH MAINTENANCE LETTER (OUTPATIENT)
Age: 44
End: 2024-11-16

## 2025-07-13 ENCOUNTER — HEALTH MAINTENANCE LETTER (OUTPATIENT)
Age: 45
End: 2025-07-13

## (undated) DIAGNOSIS — R45.86 MOOD ALTERED: ICD-10-CM

## (undated) DIAGNOSIS — F39 MOOD DISORDER (HCC): ICD-10-CM

## (undated) DEVICE — MMIS - GLOVE SURG 7.5 PROTEXIS LF CRM PF BEAD CUFF STRL

## (undated) DEVICE — MMIS - TRAY NRV BLOCK 1.5IN 18GA UNV 10% PVP IOD CSR WRAP

## (undated) DEVICE — MMIS - REMOVER PREP SOL 3M .5OZ LOTION PKT SKIN DRPRP

## (undated) DEVICE — MMIS - CONTRAST 300MG/ML 15ML ISOVUE 300-M INJ VIAL 61%

## (undated) DEVICE — MMIS - COVER MASSAGE TABLE HEADREST DISPOSABLE

## (undated) DEVICE — MMIS - BANDAGE ADH CURITY 3X1IN STRCH FABRIC 1X7/8IN STRL

## (undated) DEVICE — MMIS - NEEDLE SPNL 25GA 3.5IN REG WALL QUINCKE TIP STRL

## (undated) DEVICE — MMIS - SOLUTION SURG PRP 26ML 74% ISO ALC 0.7% IOD

## (undated) DEVICE — MMIS - SET XTN .45ML 13IN SM BORE LL CNCT INJ SITE SFST

## (undated) RX ORDER — MAGNESIUM HYDROXIDE/ALUMINUM HYDROXICE/SIMETHICONE 120; 1200; 1200 MG/30ML; MG/30ML; MG/30ML
SUSPENSION ORAL
Status: DISPENSED
Start: 2021-09-19

## (undated) RX ORDER — ASPIRIN 81 MG/1
TABLET, CHEWABLE ORAL
Status: DISPENSED
Start: 2021-09-19

## (undated) RX ORDER — KETOROLAC TROMETHAMINE 30 MG/ML
INJECTION, SOLUTION INTRAMUSCULAR; INTRAVENOUS
Status: DISPENSED
Start: 2021-09-19

## (undated) RX ORDER — LIDOCAINE HYDROCHLORIDE 20 MG/ML
SOLUTION OROPHARYNGEAL
Status: DISPENSED
Start: 2021-09-19